# Patient Record
Sex: FEMALE | Race: WHITE
[De-identification: names, ages, dates, MRNs, and addresses within clinical notes are randomized per-mention and may not be internally consistent; named-entity substitution may affect disease eponyms.]

---

## 2023-06-20 ENCOUNTER — HOSPITAL ENCOUNTER
Dept: HOSPITAL 101 - LAB | Age: 58
End: 2023-06-20
Payer: COMMERCIAL

## 2023-06-20 DIAGNOSIS — E78.5: Primary | ICD-10-CM

## 2023-06-20 LAB
CHOLESTEROL: 225 MG/DL (ref ?–200)
HDL CHOLESTEROL: 75 MG/DL (ref 40–60)
TRIGLYCERIDES: 60 MG/DL (ref ?–150)
VLDL CHOLESTEROL: 12 MG/DL

## 2023-06-20 PROCEDURE — 80061 LIPID PANEL: CPT

## 2023-06-20 PROCEDURE — 36415 COLL VENOUS BLD VENIPUNCTURE: CPT

## 2023-07-13 ENCOUNTER — HOSPITAL ENCOUNTER
Dept: HOSPITAL 101 - LAB | Age: 58
Discharge: HOME | End: 2023-07-13
Payer: COMMERCIAL

## 2023-07-13 DIAGNOSIS — Z00.00: Primary | ICD-10-CM

## 2023-07-13 LAB
ADD MANUAL DIFF: NO
ALANINE AMINOTRANSFERASE: 31 U/L (ref 14–59)
ALBUMIN GLOBULIN RATIO: 1.3
ALBUMIN LEVEL: 4.2 G/DL (ref 3.4–5)
ALKALINE PHOSPHATASE: 63 U/L (ref 46–116)
ANION GAP: 11
ASPARTATE AMINO TRANSFERASE: 18 U/L (ref 15–37)
BLOOD UREA NITROGEN: 14 MG/DL (ref 7–18)
CALCIUM: 9.2 MG/DL (ref 8.5–10.1)
CARBON DIOXIDE: 29.4 MMOL/L (ref 21–32)
CAST SEEN?: (no result) #/LPF
CHLORIDE: 105 MMOL/L (ref 98–107)
CO2 BLD-SCNC: 29.4 MMOL/L (ref 21–32)
ESTIMATED GFR (AFRICAN AMERICA: >60 (ref 60–?)
ESTIMATED GFR (NON-AFRICAN AME: >60 (ref 60–?)
GLOBULIN: 3.3 G/DL
GLUCOSE BLD-MCNC: 98 MG/DL (ref 74–106)
GLUCOSE URINE UA: NEGATIVE MG/DL
HCT VFR BLD CALC: 42.5 % (ref 36–48)
HEMATOCRIT: 42.5 % (ref 36–48)
HEMOGLOBIN: 13.8 G/DL (ref 12–16)
IMMATURE GRANULOCYTES ABS AUTO: 0.01 10^3/UL (ref 0–0.03)
IMMATURE GRANULOCYTES PCT AUTO: 0.2 % (ref 0–0.5)
IRON: 67 UG/DL (ref 50–170)
LYMPHOCYTES  ABSOLUTE AUTO: 1.5 10^3/UL (ref 1.2–3.8)
MCV RBC: 90.4 FL (ref 81–99)
MEAN CORPUSCULAR HEMOGLOBIN: 29.4 PG (ref 26.7–34)
MEAN CORPUSCULAR HGB CONC: 32.5 G/DL (ref 29.9–35.2)
MEAN CORPUSCULAR VOLUME: 90.4 FL (ref 81–99)
PLATELET # BLD: 338 10^3/UL (ref 150–450)
PLATELET COUNT: 338 10^3/UL (ref 150–450)
POTASSIUM SERPLBLD-SCNC: 4.4 MMOL/L (ref 3.5–5.1)
POTASSIUM: 4.4 MMOL/L (ref 3.5–5.1)
RED BLOOD COUNT: 4.7 10^6/UL (ref 4.2–5.4)
SODIUM BLD-SCNC: 141 MMOL/L (ref 136–145)
SODIUM: 141 MMOL/L (ref 136–145)
THYROID STIMULATING HORMONE: 1 UIU/ML (ref 0.36–3.74)
TOTAL PROTEIN: 7.5 G/DL (ref 6.4–8.2)
WBC # BLD: 5.4 10^3/UL (ref 4–11)
WHITE BLOOD COUNT: 5.4 10^3/UL (ref 4–11)

## 2023-07-13 PROCEDURE — 84443 ASSAY THYROID STIM HORMONE: CPT

## 2023-07-13 PROCEDURE — 84439 ASSAY OF FREE THYROXINE: CPT

## 2023-07-13 PROCEDURE — 83540 ASSAY OF IRON: CPT

## 2023-07-13 PROCEDURE — 87150 DNA/RNA AMPLIFIED PROBE: CPT

## 2023-07-13 PROCEDURE — 85025 COMPLETE CBC W/AUTO DIFF WBC: CPT

## 2023-07-13 PROCEDURE — 81001 URINALYSIS AUTO W/SCOPE: CPT

## 2023-07-13 PROCEDURE — 36415 COLL VENOUS BLD VENIPUNCTURE: CPT

## 2023-07-13 PROCEDURE — 80061 LIPID PANEL: CPT

## 2023-07-13 PROCEDURE — 80053 COMPREHEN METABOLIC PANEL: CPT

## 2023-07-13 PROCEDURE — 87086 URINE CULTURE/COLONY COUNT: CPT

## 2023-07-13 PROCEDURE — 83525 ASSAY OF INSULIN: CPT

## 2023-07-13 PROCEDURE — 87186 SC STD MICRODIL/AGAR DIL: CPT

## 2023-07-13 PROCEDURE — 83036 HEMOGLOBIN GLYCOSYLATED A1C: CPT

## 2023-07-14 LAB
CHOLESTEROL: 216 MG/DL (ref ?–200)
HDL CHOLESTEROL: 67 MG/DL (ref 40–60)
TRIGLYCERIDES: 66 MG/DL (ref ?–150)
VLDL CHOLESTEROL: 13.2 MG/DL

## 2023-07-24 ENCOUNTER — HOSPITAL ENCOUNTER
Dept: HOSPITAL 101 - LAB | Age: 58
Discharge: HOME | End: 2023-07-24
Payer: COMMERCIAL

## 2023-07-24 ENCOUNTER — HOSPITAL ENCOUNTER (OUTPATIENT)
Age: 58
Discharge: HOME | End: 2023-07-24
Payer: COMMERCIAL

## 2023-07-24 DIAGNOSIS — N30.00: Primary | ICD-10-CM

## 2023-07-24 LAB
ABO + RH BLD: NORMAL
ARM BAND NUMBER: NORMAL
BLOOD BANK SAMPLE EXPIRATION: NORMAL
BLOOD GROUP ANTIBODIES SERPL: NEGATIVE
CAST SEEN?: (no result) #/LPF
GLUCOSE URINE UA: NEGATIVE MG/DL
URINE CULTURE INDICATED: (no result)

## 2023-07-24 PROCEDURE — 81001 URINALYSIS AUTO W/SCOPE: CPT

## 2023-07-24 PROCEDURE — 36415 COLL VENOUS BLD VENIPUNCTURE: CPT

## 2023-07-24 PROCEDURE — 86850 RBC ANTIBODY SCREEN: CPT

## 2023-07-24 PROCEDURE — 86900 BLOOD TYPING SEROLOGIC ABO: CPT

## 2023-07-24 PROCEDURE — 86901 BLOOD TYPING SEROLOGIC RH(D): CPT

## 2023-07-24 PROCEDURE — 87086 URINE CULTURE/COLONY COUNT: CPT

## 2023-07-25 RX ORDER — ASPIRIN 81 MG/1
81 TABLET ORAL
COMMUNITY
Start: 2023-02-10

## 2023-07-25 RX ORDER — EZETIMIBE 10 MG/1
10 TABLET ORAL
COMMUNITY
Start: 2023-02-06

## 2023-07-25 RX ORDER — LANSOPRAZOLE 30 MG/1
30 CAPSULE, DELAYED RELEASE ORAL DAILY
COMMUNITY

## 2023-07-25 NOTE — PROGRESS NOTES
Preoperative Instructions:    Stop eating solid foods at midnight the night prior to your surgery. Stop drinking clear liquids at midnight the night prior to your surgery. Arrive at the surgery center (3rd entrance) on __0-80-29_____________ by __0830_____________. Please stop any blood thinning medications as directed by your surgeon or prescribing physician. Failure to stop certain medications may interfere with your scheduled surgery. These may include: Aspirin, Coumadin, Plavix, NSAIDS (Motrin, Aleve, Advil, Mobic, Celebrex), Eliquis, Pradaxa, Xarelto, Fish oil, and herbal supplements. Hold Aspirin as     You may continue the rest of your medications through the night before surgery unless instructed otherwise. Day of surgery please take only the following medication(s) with a small sip of water: Prevacid      Please shower  with antibacterial saop and water the day before and the morning of  surgery and the  day of surgery. Reminders:  -If you are going home the day of your procedure, you will need a family member or friend to stay during the procedure and drive you home after your procedure. Your  must be 25years of age or older and able to sign off on your discharge instructions.    -If you are going home the same day of your surgery, someone must remain with you for the first 24 hours after your surgery if you receive sedation or anesthesia.      -Please do not wear any jewelery , lotions or body piercing the day of surgery

## 2023-07-26 ENCOUNTER — ANESTHESIA EVENT (OUTPATIENT)
Dept: OPERATING ROOM | Age: 58
End: 2023-07-26
Payer: COMMERCIAL

## 2023-07-30 PROBLEM — N81.6 CYSTOCELE WITH RECTOCELE: Status: ACTIVE | Noted: 2023-07-30

## 2023-07-30 PROBLEM — N81.10 CYSTOCELE WITH RECTOCELE: Status: ACTIVE | Noted: 2023-07-30

## 2023-07-30 PROBLEM — N81.4 UTERINE PROLAPSE: Status: ACTIVE | Noted: 2023-07-30

## 2023-07-30 PROBLEM — Z98.890 POSTOPERATIVE STATE: Status: ACTIVE | Noted: 2023-07-30

## 2023-07-30 PROBLEM — N39.3 STRESS INCONTINENCE OF URINE: Status: ACTIVE | Noted: 2023-07-30

## 2023-07-31 ENCOUNTER — ANESTHESIA (OUTPATIENT)
Dept: OPERATING ROOM | Age: 58
End: 2023-07-31
Payer: COMMERCIAL

## 2023-07-31 ENCOUNTER — HOSPITAL ENCOUNTER (OUTPATIENT)
Age: 58
Setting detail: OUTPATIENT SURGERY
Discharge: HOME | End: 2023-07-31
Attending: OBSTETRICS & GYNECOLOGY | Admitting: OBSTETRICS & GYNECOLOGY
Payer: COMMERCIAL

## 2023-07-31 VITALS
HEART RATE: 59 BPM | BODY MASS INDEX: 27.99 KG/M2 | SYSTOLIC BLOOD PRESSURE: 128 MMHG | TEMPERATURE: 97.2 F | HEIGHT: 65 IN | WEIGHT: 168 LBS | DIASTOLIC BLOOD PRESSURE: 82 MMHG | OXYGEN SATURATION: 98 % | RESPIRATION RATE: 11 BRPM

## 2023-07-31 DIAGNOSIS — N81.11 CYSTOCELE, MIDLINE: ICD-10-CM

## 2023-07-31 DIAGNOSIS — N81.2 UTEROVAGINAL PROLAPSE, INCOMPLETE: ICD-10-CM

## 2023-07-31 DIAGNOSIS — N81.6 RECTOCELE: ICD-10-CM

## 2023-07-31 DIAGNOSIS — G89.18 POST-OP PAIN: Primary | ICD-10-CM

## 2023-07-31 DIAGNOSIS — R32 URINARY INCONTINENCE, UNSPECIFIED TYPE: ICD-10-CM

## 2023-07-31 DIAGNOSIS — R35.0 URINARY FREQUENCY: ICD-10-CM

## 2023-07-31 LAB
ABO + RH BLD: NORMAL
ARM BAND NUMBER: NORMAL
BLOOD BANK SAMPLE EXPIRATION: NORMAL
BLOOD GROUP ANTIBODIES SERPL: NEGATIVE
HCG, PREGNANCY URINE (POC): NEGATIVE
HCT VFR BLD AUTO: 38 % (ref 36–46)
HGB BLD-MCNC: 12.6 G/DL (ref 12–16)

## 2023-07-31 PROCEDURE — 2580000003 HC RX 258: Performed by: ANESTHESIOLOGY

## 2023-07-31 PROCEDURE — 7100000011 HC PHASE II RECOVERY - ADDTL 15 MIN: Performed by: OBSTETRICS & GYNECOLOGY

## 2023-07-31 PROCEDURE — 6370000000 HC RX 637 (ALT 250 FOR IP)

## 2023-07-31 PROCEDURE — 6360000002 HC RX W HCPCS

## 2023-07-31 PROCEDURE — 3700000000 HC ANESTHESIA ATTENDED CARE: Performed by: OBSTETRICS & GYNECOLOGY

## 2023-07-31 PROCEDURE — 86850 RBC ANTIBODY SCREEN: CPT

## 2023-07-31 PROCEDURE — 2580000003 HC RX 258: Performed by: OBSTETRICS & GYNECOLOGY

## 2023-07-31 PROCEDURE — 7100000010 HC PHASE II RECOVERY - FIRST 15 MIN: Performed by: OBSTETRICS & GYNECOLOGY

## 2023-07-31 PROCEDURE — 7100000000 HC PACU RECOVERY - FIRST 15 MIN: Performed by: OBSTETRICS & GYNECOLOGY

## 2023-07-31 PROCEDURE — S2900 ROBOTIC SURGICAL SYSTEM: HCPCS | Performed by: OBSTETRICS & GYNECOLOGY

## 2023-07-31 PROCEDURE — 3600000019 HC SURGERY ROBOT ADDTL 15MIN: Performed by: OBSTETRICS & GYNECOLOGY

## 2023-07-31 PROCEDURE — 6370000000 HC RX 637 (ALT 250 FOR IP): Performed by: ANESTHESIOLOGY

## 2023-07-31 PROCEDURE — 6360000002 HC RX W HCPCS: Performed by: OBSTETRICS & GYNECOLOGY

## 2023-07-31 PROCEDURE — 2580000003 HC RX 258: Performed by: NURSE ANESTHETIST, CERTIFIED REGISTERED

## 2023-07-31 PROCEDURE — 3700000001 HC ADD 15 MINUTES (ANESTHESIA): Performed by: OBSTETRICS & GYNECOLOGY

## 2023-07-31 PROCEDURE — 85018 HEMOGLOBIN: CPT

## 2023-07-31 PROCEDURE — 2709999900 HC NON-CHARGEABLE SUPPLY: Performed by: OBSTETRICS & GYNECOLOGY

## 2023-07-31 PROCEDURE — 88302 TISSUE EXAM BY PATHOLOGIST: CPT

## 2023-07-31 PROCEDURE — L8606 SYNTHETIC IMPLNT URINARY 1ML: HCPCS | Performed by: OBSTETRICS & GYNECOLOGY

## 2023-07-31 PROCEDURE — 3600000009 HC SURGERY ROBOT BASE: Performed by: OBSTETRICS & GYNECOLOGY

## 2023-07-31 PROCEDURE — 6360000002 HC RX W HCPCS: Performed by: NURSE ANESTHETIST, CERTIFIED REGISTERED

## 2023-07-31 PROCEDURE — 86901 BLOOD TYPING SEROLOGIC RH(D): CPT

## 2023-07-31 PROCEDURE — 88305 TISSUE EXAM BY PATHOLOGIST: CPT

## 2023-07-31 PROCEDURE — 2500000003 HC RX 250 WO HCPCS: Performed by: NURSE ANESTHETIST, CERTIFIED REGISTERED

## 2023-07-31 PROCEDURE — 85014 HEMATOCRIT: CPT

## 2023-07-31 PROCEDURE — 36415 COLL VENOUS BLD VENIPUNCTURE: CPT

## 2023-07-31 PROCEDURE — 7100000001 HC PACU RECOVERY - ADDTL 15 MIN: Performed by: OBSTETRICS & GYNECOLOGY

## 2023-07-31 PROCEDURE — 86900 BLOOD TYPING SEROLOGIC ABO: CPT

## 2023-07-31 PROCEDURE — 6360000002 HC RX W HCPCS: Performed by: ANESTHESIOLOGY

## 2023-07-31 PROCEDURE — 81025 URINE PREGNANCY TEST: CPT

## 2023-07-31 DEVICE — BULKAMID URETHRAL BULKING SYSTEM 2ML
Type: IMPLANTABLE DEVICE | Site: URETHRA | Status: FUNCTIONAL
Brand: BULKAMID

## 2023-07-31 RX ORDER — SODIUM CHLORIDE 0.9 % (FLUSH) 0.9 %
5-40 SYRINGE (ML) INJECTION EVERY 12 HOURS SCHEDULED
Status: DISCONTINUED | OUTPATIENT
Start: 2023-07-31 | End: 2023-07-31 | Stop reason: HOSPADM

## 2023-07-31 RX ORDER — SENNA AND DOCUSATE SODIUM 50; 8.6 MG/1; MG/1
1 TABLET, FILM COATED ORAL 2 TIMES DAILY
Qty: 60 TABLET | Refills: 1 | Status: SHIPPED | OUTPATIENT
Start: 2023-07-31

## 2023-07-31 RX ORDER — SODIUM CHLORIDE 0.9 % (FLUSH) 0.9 %
5-40 SYRINGE (ML) INJECTION PRN
Status: DISCONTINUED | OUTPATIENT
Start: 2023-07-31 | End: 2023-07-31 | Stop reason: HOSPADM

## 2023-07-31 RX ORDER — HYDROCODONE BITARTRATE AND ACETAMINOPHEN 5; 325 MG/1; MG/1
1 TABLET ORAL ONCE
Status: COMPLETED | OUTPATIENT
Start: 2023-07-31 | End: 2023-07-31

## 2023-07-31 RX ORDER — PROPOFOL 10 MG/ML
INJECTION, EMULSION INTRAVENOUS PRN
Status: DISCONTINUED | OUTPATIENT
Start: 2023-07-31 | End: 2023-07-31 | Stop reason: SDUPTHER

## 2023-07-31 RX ORDER — FENTANYL CITRATE 50 UG/ML
INJECTION, SOLUTION INTRAMUSCULAR; INTRAVENOUS PRN
Status: DISCONTINUED | OUTPATIENT
Start: 2023-07-31 | End: 2023-07-31 | Stop reason: SDUPTHER

## 2023-07-31 RX ORDER — KETOROLAC TROMETHAMINE 30 MG/ML
INJECTION, SOLUTION INTRAMUSCULAR; INTRAVENOUS PRN
Status: DISCONTINUED | OUTPATIENT
Start: 2023-07-31 | End: 2023-07-31 | Stop reason: SDUPTHER

## 2023-07-31 RX ORDER — BUPIVACAINE HYDROCHLORIDE 5 MG/ML
INJECTION, SOLUTION PERINEURAL PRN
Status: DISCONTINUED | OUTPATIENT
Start: 2023-07-31 | End: 2023-07-31 | Stop reason: ALTCHOICE

## 2023-07-31 RX ORDER — MORPHINE SULFATE 2 MG/ML
2 INJECTION, SOLUTION INTRAMUSCULAR; INTRAVENOUS EVERY 5 MIN PRN
Status: DISCONTINUED | OUTPATIENT
Start: 2023-07-31 | End: 2023-07-31 | Stop reason: HOSPADM

## 2023-07-31 RX ORDER — HYDROCODONE BITARTRATE AND ACETAMINOPHEN 5; 325 MG/1; MG/1
1 TABLET ORAL EVERY 6 HOURS PRN
Qty: 20 TABLET | Refills: 0 | Status: SHIPPED | OUTPATIENT
Start: 2023-07-31 | End: 2023-08-05

## 2023-07-31 RX ORDER — NEOSTIGMINE METHYLSULFATE 5 MG/5 ML
SYRINGE (ML) INTRAVENOUS PRN
Status: DISCONTINUED | OUTPATIENT
Start: 2023-07-31 | End: 2023-07-31 | Stop reason: SDUPTHER

## 2023-07-31 RX ORDER — MIDAZOLAM HYDROCHLORIDE 2 MG/2ML
2 INJECTION, SOLUTION INTRAMUSCULAR; INTRAVENOUS
Status: DISCONTINUED | OUTPATIENT
Start: 2023-07-31 | End: 2023-07-31 | Stop reason: HOSPADM

## 2023-07-31 RX ORDER — SIMETHICONE 80 MG
80 TABLET,CHEWABLE ORAL 4 TIMES DAILY PRN
Qty: 60 TABLET | Refills: 1 | Status: SHIPPED | OUTPATIENT
Start: 2023-07-31

## 2023-07-31 RX ORDER — ONDANSETRON 2 MG/ML
4 INJECTION INTRAMUSCULAR; INTRAVENOUS
Status: COMPLETED | OUTPATIENT
Start: 2023-07-31 | End: 2023-07-31

## 2023-07-31 RX ORDER — MORPHINE SULFATE 2 MG/ML
INJECTION, SOLUTION INTRAMUSCULAR; INTRAVENOUS
Status: DISCONTINUED
Start: 2023-07-31 | End: 2023-07-31 | Stop reason: HOSPADM

## 2023-07-31 RX ORDER — SODIUM CHLORIDE 9 MG/ML
INJECTION, SOLUTION INTRAVENOUS CONTINUOUS PRN
Status: DISCONTINUED | OUTPATIENT
Start: 2023-07-31 | End: 2023-07-31 | Stop reason: SDUPTHER

## 2023-07-31 RX ORDER — MEPERIDINE HYDROCHLORIDE 50 MG/ML
12.5 INJECTION INTRAMUSCULAR; INTRAVENOUS; SUBCUTANEOUS EVERY 5 MIN PRN
Status: DISCONTINUED | OUTPATIENT
Start: 2023-07-31 | End: 2023-07-31 | Stop reason: HOSPADM

## 2023-07-31 RX ORDER — PHENAZOPYRIDINE HYDROCHLORIDE 100 MG/1
TABLET, FILM COATED ORAL
Status: DISCONTINUED
Start: 2023-07-31 | End: 2023-07-31 | Stop reason: HOSPADM

## 2023-07-31 RX ORDER — SODIUM CHLORIDE 9 MG/ML
INJECTION, SOLUTION INTRAVENOUS PRN
Status: DISCONTINUED | OUTPATIENT
Start: 2023-07-31 | End: 2023-07-31 | Stop reason: HOSPADM

## 2023-07-31 RX ORDER — MORPHINE SULFATE 2 MG/ML
INJECTION, SOLUTION INTRAMUSCULAR; INTRAVENOUS
Status: COMPLETED
Start: 2023-07-31 | End: 2023-07-31

## 2023-07-31 RX ORDER — LIDOCAINE HYDROCHLORIDE 10 MG/ML
INJECTION, SOLUTION INFILTRATION; PERINEURAL PRN
Status: DISCONTINUED | OUTPATIENT
Start: 2023-07-31 | End: 2023-07-31 | Stop reason: SDUPTHER

## 2023-07-31 RX ORDER — CEPHALEXIN 500 MG/1
500 CAPSULE ORAL 3 TIMES DAILY
Qty: 21 CAPSULE | Refills: 0 | Status: SHIPPED | OUTPATIENT
Start: 2023-07-31 | End: 2023-08-07

## 2023-07-31 RX ORDER — IBUPROFEN 600 MG/1
600 TABLET ORAL EVERY 6 HOURS PRN
Qty: 60 TABLET | Refills: 1 | Status: SHIPPED | OUTPATIENT
Start: 2023-07-31 | End: 2023-08-30

## 2023-07-31 RX ORDER — METOCLOPRAMIDE HYDROCHLORIDE 5 MG/ML
INJECTION INTRAMUSCULAR; INTRAVENOUS
Status: DISCONTINUED
Start: 2023-07-31 | End: 2023-07-31 | Stop reason: HOSPADM

## 2023-07-31 RX ORDER — ONDANSETRON 2 MG/ML
INJECTION INTRAMUSCULAR; INTRAVENOUS
Status: DISCONTINUED
Start: 2023-07-31 | End: 2023-07-31 | Stop reason: HOSPADM

## 2023-07-31 RX ORDER — SCOLOPAMINE TRANSDERMAL SYSTEM 1 MG/1
PATCH, EXTENDED RELEASE TRANSDERMAL
Status: DISCONTINUED
Start: 2023-07-31 | End: 2023-07-31 | Stop reason: HOSPADM

## 2023-07-31 RX ORDER — LABETALOL HYDROCHLORIDE 5 MG/ML
10 INJECTION, SOLUTION INTRAVENOUS
Status: DISCONTINUED | OUTPATIENT
Start: 2023-07-31 | End: 2023-07-31 | Stop reason: HOSPADM

## 2023-07-31 RX ORDER — TAMSULOSIN HYDROCHLORIDE 0.4 MG/1
0.4 CAPSULE ORAL DAILY
Qty: 7 CAPSULE | Refills: 0 | Status: SHIPPED | OUTPATIENT
Start: 2023-07-31 | End: 2023-07-31 | Stop reason: HOSPADM

## 2023-07-31 RX ORDER — GLYCOPYRROLATE 0.2 MG/ML
INJECTION INTRAMUSCULAR; INTRAVENOUS PRN
Status: DISCONTINUED | OUTPATIENT
Start: 2023-07-31 | End: 2023-07-31 | Stop reason: SDUPTHER

## 2023-07-31 RX ORDER — ROCURONIUM BROMIDE 10 MG/ML
INJECTION, SOLUTION INTRAVENOUS PRN
Status: DISCONTINUED | OUTPATIENT
Start: 2023-07-31 | End: 2023-07-31 | Stop reason: SDUPTHER

## 2023-07-31 RX ORDER — DEXAMETHASONE SODIUM PHOSPHATE 10 MG/ML
INJECTION, SOLUTION INTRAMUSCULAR; INTRAVENOUS PRN
Status: DISCONTINUED | OUTPATIENT
Start: 2023-07-31 | End: 2023-07-31 | Stop reason: SDUPTHER

## 2023-07-31 RX ORDER — CEFAZOLIN 2 G/1
INJECTION, POWDER, FOR SOLUTION INTRAMUSCULAR; INTRAVENOUS
Status: DISCONTINUED
Start: 2023-07-31 | End: 2023-07-31 | Stop reason: HOSPADM

## 2023-07-31 RX ORDER — MIDAZOLAM HYDROCHLORIDE 1 MG/ML
INJECTION INTRAMUSCULAR; INTRAVENOUS PRN
Status: DISCONTINUED | OUTPATIENT
Start: 2023-07-31 | End: 2023-07-31 | Stop reason: SDUPTHER

## 2023-07-31 RX ORDER — SCOLOPAMINE TRANSDERMAL SYSTEM 1 MG/1
1 PATCH, EXTENDED RELEASE TRANSDERMAL
Status: DISCONTINUED | OUTPATIENT
Start: 2023-07-31 | End: 2023-07-31 | Stop reason: HOSPADM

## 2023-07-31 RX ORDER — PHENAZOPYRIDINE HYDROCHLORIDE 100 MG/1
200 TABLET, FILM COATED ORAL ONCE
Status: COMPLETED | OUTPATIENT
Start: 2023-07-31 | End: 2023-07-31

## 2023-07-31 RX ORDER — SODIUM CHLORIDE, SODIUM LACTATE, POTASSIUM CHLORIDE, CALCIUM CHLORIDE 600; 310; 30; 20 MG/100ML; MG/100ML; MG/100ML; MG/100ML
INJECTION, SOLUTION INTRAVENOUS CONTINUOUS
Status: DISCONTINUED | OUTPATIENT
Start: 2023-07-31 | End: 2023-07-31 | Stop reason: HOSPADM

## 2023-07-31 RX ORDER — PHENAZOPYRIDINE HYDROCHLORIDE 100 MG/1
TABLET, FILM COATED ORAL
Status: COMPLETED
Start: 2023-07-31 | End: 2023-07-31

## 2023-07-31 RX ORDER — METOCLOPRAMIDE HYDROCHLORIDE 5 MG/ML
10 INJECTION INTRAMUSCULAR; INTRAVENOUS
Status: COMPLETED | OUTPATIENT
Start: 2023-07-31 | End: 2023-07-31

## 2023-07-31 RX ORDER — DIPHENHYDRAMINE HYDROCHLORIDE 50 MG/ML
12.5 INJECTION INTRAMUSCULAR; INTRAVENOUS
Status: DISCONTINUED | OUTPATIENT
Start: 2023-07-31 | End: 2023-07-31 | Stop reason: HOSPADM

## 2023-07-31 RX ORDER — HYDROCODONE BITARTRATE AND ACETAMINOPHEN 5; 325 MG/1; MG/1
TABLET ORAL
Status: DISCONTINUED
Start: 2023-07-31 | End: 2023-07-31 | Stop reason: HOSPADM

## 2023-07-31 RX ORDER — ONDANSETRON 4 MG/1
4 TABLET, FILM COATED ORAL EVERY 6 HOURS PRN
Qty: 20 TABLET | Refills: 1 | Status: SHIPPED | OUTPATIENT
Start: 2023-07-31

## 2023-07-31 RX ORDER — ONDANSETRON 2 MG/ML
INJECTION INTRAMUSCULAR; INTRAVENOUS PRN
Status: DISCONTINUED | OUTPATIENT
Start: 2023-07-31 | End: 2023-07-31 | Stop reason: SDUPTHER

## 2023-07-31 RX ADMIN — MORPHINE SULFATE 2 MG: 2 INJECTION, SOLUTION INTRAMUSCULAR; INTRAVENOUS at 12:58

## 2023-07-31 RX ADMIN — GLYCOPYRROLATE 0.4 MG: 0.2 INJECTION INTRAMUSCULAR; INTRAVENOUS at 12:11

## 2023-07-31 RX ADMIN — PHENAZOPYRIDINE HYDROCHLORIDE 200 MG: 100 TABLET, FILM COATED ORAL at 09:21

## 2023-07-31 RX ADMIN — Medication 3 MG: at 12:11

## 2023-07-31 RX ADMIN — ROCURONIUM BROMIDE 50 MG: 10 INJECTION, SOLUTION INTRAVENOUS at 10:43

## 2023-07-31 RX ADMIN — FENTANYL CITRATE 50 MCG: 50 INJECTION, SOLUTION INTRAMUSCULAR; INTRAVENOUS at 12:39

## 2023-07-31 RX ADMIN — SODIUM CHLORIDE, POTASSIUM CHLORIDE, SODIUM LACTATE AND CALCIUM CHLORIDE: 600; 310; 30; 20 INJECTION, SOLUTION INTRAVENOUS at 10:40

## 2023-07-31 RX ADMIN — LIDOCAINE HYDROCHLORIDE 40 MG: 10 INJECTION, SOLUTION INFILTRATION; PERINEURAL at 10:43

## 2023-07-31 RX ADMIN — FENTANYL CITRATE 50 MCG: 50 INJECTION, SOLUTION INTRAMUSCULAR; INTRAVENOUS at 12:13

## 2023-07-31 RX ADMIN — DEXAMETHASONE SODIUM PHOSPHATE 10 MG: 10 INJECTION, SOLUTION INTRAMUSCULAR; INTRAVENOUS at 10:57

## 2023-07-31 RX ADMIN — MORPHINE SULFATE 2 MG: 2 INJECTION, SOLUTION INTRAMUSCULAR; INTRAVENOUS at 13:13

## 2023-07-31 RX ADMIN — PHENAZOPYRIDINE HYDROCHLORIDE 200 MG: 100 TABLET ORAL at 09:21

## 2023-07-31 RX ADMIN — HYDROMORPHONE HYDROCHLORIDE 0.5 MG: 1 INJECTION, SOLUTION INTRAMUSCULAR; INTRAVENOUS; SUBCUTANEOUS at 13:45

## 2023-07-31 RX ADMIN — ONDANSETRON 4 MG: 2 INJECTION INTRAMUSCULAR; INTRAVENOUS at 14:08

## 2023-07-31 RX ADMIN — CEFAZOLIN 2000 MG: 2 INJECTION, POWDER, FOR SOLUTION INTRAMUSCULAR; INTRAVENOUS at 10:54

## 2023-07-31 RX ADMIN — ONDANSETRON 4 MG: 2 INJECTION INTRAMUSCULAR; INTRAVENOUS at 12:10

## 2023-07-31 RX ADMIN — FENTANYL CITRATE 100 MCG: 50 INJECTION, SOLUTION INTRAMUSCULAR; INTRAVENOUS at 10:43

## 2023-07-31 RX ADMIN — METOCLOPRAMIDE 10 MG: 5 INJECTION, SOLUTION INTRAMUSCULAR; INTRAVENOUS at 15:25

## 2023-07-31 RX ADMIN — HYDROCODONE BITARTRATE AND ACETAMINOPHEN 1 TABLET: 5; 325 TABLET ORAL at 15:28

## 2023-07-31 RX ADMIN — PROPOFOL 200 MG: 10 INJECTION, EMULSION INTRAVENOUS at 10:43

## 2023-07-31 RX ADMIN — KETOROLAC TROMETHAMINE 30 MG: 30 INJECTION, SOLUTION INTRAMUSCULAR; INTRAVENOUS at 12:10

## 2023-07-31 RX ADMIN — MIDAZOLAM 2 MG: 1 INJECTION INTRAMUSCULAR; INTRAVENOUS at 10:40

## 2023-07-31 RX ADMIN — ROCURONIUM BROMIDE 20 MG: 10 INJECTION, SOLUTION INTRAVENOUS at 11:14

## 2023-07-31 RX ADMIN — SODIUM CHLORIDE: 9 INJECTION, SOLUTION INTRAVENOUS at 12:10

## 2023-07-31 ASSESSMENT — PAIN SCALES - GENERAL
PAINLEVEL_OUTOF10: 6
PAINLEVEL_OUTOF10: 5
PAINLEVEL_OUTOF10: 7
PAINLEVEL_OUTOF10: 4
PAINLEVEL_OUTOF10: 0
PAINLEVEL_OUTOF10: 3

## 2023-07-31 ASSESSMENT — PAIN - FUNCTIONAL ASSESSMENT: PAIN_FUNCTIONAL_ASSESSMENT: NONE - DENIES PAIN

## 2023-07-31 ASSESSMENT — PAIN DESCRIPTION - DESCRIPTORS
DESCRIPTORS: PRESSURE
DESCRIPTORS: ACHING;SORE;PRESSURE
DESCRIPTORS: PRESSURE;SORE
DESCRIPTORS: ACHING;SORE;PRESSURE

## 2023-07-31 ASSESSMENT — PAIN DESCRIPTION - LOCATION
LOCATION: ABDOMEN

## 2023-07-31 ASSESSMENT — PAIN DESCRIPTION - ORIENTATION
ORIENTATION: LOWER

## 2023-07-31 NOTE — ANESTHESIA POSTPROCEDURE EVALUATION
Department of Anesthesiology  Postprocedure Note    Patient: Yoni Ramos  MRN: [id number]  YOB: 1965  Date of evaluation: 7/31/2023      Procedure Summary     Date: 07/31/23 Room / Location: [facility] OR [address] [place] [address]    Anesthesia Start: 8553 Anesthesia Stop: 6389    Procedures:       LAPAROSCOPIC ROBOTIC ASSISTED VAGINAL HYSTERECTOMY  WITH BILATERAL SALPINGECTOMY,  ANTERIOR COLPORRHAPHY AND AND POSTERIOR COLPOPERINEORRHAPHY      CYSTOSCOPY WITH BULKAMID INJECTION Diagnosis:       Urinary incontinence, unspecified type      Urinary frequency      Cystocele, midline      Rectocele      Uterovaginal prolapse, incomplete      (Urinary incontinence, unspecified type [R32])      (Urinary frequency [R35.0])      (Cystocele, midline [N81.11])      (Rectocele [N81.6])      (Uterovaginal prolapse, incomplete [N81.2])    Surgeons:  Alessia Pugh DO Responsible Provider: Rico Medellin MD    Anesthesia Type: general ASA Status: 2          Anesthesia Type: General    Chelsie Phase I: Chelsie Score: 8    Chelsie Phase II:        Anesthesia Post Evaluation    Patient location during evaluation: PACU  Patient participation: complete - patient participated  Level of consciousness: awake  Airway patency: patent  Nausea & Vomiting: no nausea and no vomiting  Complications: no  Cardiovascular status: blood pressure returned to baseline  Respiratory status: acceptable  Hydration status: euvolemic  Pain management: adequate

## 2023-07-31 NOTE — H&P
UroGyn Pre-Op H&P  [facility]    Patient Name: Allyn Jeter     Patient : 1965  Room/Bed: STVZP OR [place] RM/NONE  Admission Date/Time: 2023  8:14 AM  Primary Care Physician: Sona Santillan MD  MRN: [id number]    Date: 2023  Time: 10:06 AM    The patient was seen in pre-op holding. She is here for previously scheduled Robotic assisted laparoscopic hysterectomy, bilateral salpingo-oophorectomy, anterior and posterior vaginal repairs, cystoscopy with Bulkamid injection. The patient is being admitted for a planned elective surgical procedure today. She has had symptoms, physical findings, and diagnostic testing that have an appropriate indication for today's planned procedure. The patient has been educated about their condition, conservative and surgical options was been offered to the patient, and the patient has decided to proceed with a surgical option. An informed consent has been signed under no duress or confusion. If indicated, the patient has been surgically cleared by her PCP and /or any pertinent specialist. All labs and testing have been reviewed. If of reproductive age and without permanent sterilization, a pregnancy test was confirmed as negative. The patient has satisfactorily completed any pre-operative preparations prior to surgery. If MRSA positive, she had completed the standard surgical preparation protocol. The patient took any required medicines prior to surgery with a sip of water but otherwise had taken nothing by mouth. The patient has discontinued any blood thinners as required to proceed with surgery. The patient denies chest pain, shortness of breath, calf pain, or open sores on the day of surgery. All dentures and body jewelry have been removed and / or taped. REVIEW OF SYSTEMS:  14 point ROS negative except for above listed in HPI.    General/Constitutional: Denies chills, fever, headaches, weight gain, weight loss   Ophthalmologic: Denies recent

## 2023-07-31 NOTE — DISCHARGE SUMMARY
5-325 MG per tablet  Commonly known as: Norco  Take 1 tablet by mouth every 6 hours as needed for Pain for up to 5 days. Intended supply: 5 days. Take lowest dose possible to manage pain Max Daily Amount: 4 tablets     ibuprofen 600 MG tablet  Commonly known as: ADVIL;MOTRIN  Take 1 tablet by mouth every 6 hours as needed for Pain     ondansetron 4 MG tablet  Commonly known as: Zofran  Take 1 tablet by mouth every 6 hours as needed for Nausea or Vomiting     sennosides-docusate sodium 8.6-50 MG tablet  Commonly known as: SENOKOT-S  Take 1 tablet by mouth in the morning and at bedtime     simethicone 80 MG chewable tablet  Commonly known as: MYLICON  Take 1 tablet by mouth 4 times daily as needed for Flatulence            CONTINUE taking these medications      aspirin 81 MG EC tablet     CLARITIN-D 12 HOUR PO     ezetimibe 10 MG tablet  Commonly known as: ZETIA     fluticasone 27.5 MCG/SPRAY nasal spray  Commonly known as: VERAMYST     lansoprazole 30 MG delayed release capsule  Commonly known as: PREVACID     MIRALAX PO               Where to Get Your Medications        These medications were sent to [facility], [facility] [address] [place] [address], [facility] [address]      Phone: [phone]   cephALEXin 500 MG capsule  HYDROcodone-acetaminophen 5-325 MG per tablet  ibuprofen 600 MG tablet  ondansetron 4 MG tablet  sennosides-docusate sodium 8.6-50 MG tablet  simethicone 80 MG chewable tablet           Activity: pelvic rest x 8 weeks, no driving on narcotics, no lifting greater than 15 lbs  Diet: regular diet  Follow up: 1 week     Condition on discharge: good and stable   Discharge Date: 7/31/23      Comments:  Home care, Follow-up care, restrictions reviewed.     Gurmeet Jacobson DO  Urogynecology Resident  [facility]  7/31/2023, 3:31 PM

## 2023-07-31 NOTE — PROGRESS NOTES
CLINICAL PHARMACY NOTE: MEDS TO BEDS    Total # of Prescriptions Filled: 7   The following medications were delivered to the patient:  Tamsulosin 0.4mg  Simethicone 80mg  Ondansetron 4mg  Cephalexin 500mg  Stool softener plus laxative 8.6-50mg  Hydrocodone-APAP 5-325mg  Ibuprofen 600mg    Additional Documentation:  Delivered to patient and guest at 1:15pm. Paid $20.87 with [employer].  HR

## 2023-07-31 NOTE — DISCHARGE INSTRUCTIONS
Cleaning the Skin Near the Stoma:  You may take showers, but check with your doctor about bathing, hot tubs, and swimming pools. Avoid using creams, powders or sprays near the tube site. Get the Things You Will Need:   Gloves      * Warm water and non-scented soap   Clean, dry washcloth and towel   * Clean gauze bandage   Surgical tape     * Plastic Trash Bag   Wash your hands with soap and water. Put on clean gloves   Hold the skin on all sides of the stoma. Gently take off the bandage. Be careful not to pull out the catheter. Throw the bandage away in the trash bag. Check for redness or swelling, colored or foul-smelling urine or drainage, or skin changes. These may be signs of an infection. Call your doctor. Hold the end of the catheter tube while cleaning. Wash the base of the catheter and the skin near the stoma with warm soap and water. Wash away from the stoma, gently pat dry with a towel. Secure the catheter with a clean gauze bandage and tape. Changing the Catheter:   Get the Things You Will Need:   Drainage bag       * Sterile gloves   Syringe to remove water in the catheter balloon   * Clean sterile gauze bandage   Surgical tape       * Plastic trash bag   Wash your hands with soap and water before and after changing the catheter. Put on clean gloves. Take off the old bandage or dressing and throw in the trash bag. Clean the skin at the site. Remove and throw away your gloves. Open packages carefully before putting on sterile gloves so you do not infect yourself once gloved. Be careful not to contaminate the sterile items inside the packages, mainly the new catheter. It is very helpful to have someone help you. Put on sterile gloves and take care to keep things sterile at all times when working with the new catheter. Fix the new catheter as you have been trained. Using the syringe, remove water from the old catheter bulb.    Keep your fingers close to the

## 2023-08-01 NOTE — OP NOTE
5 [facility], [facility][note 3]0 [facility]                                OPERATIVE REPORT    PATIENT NAME: Martín Tracey                     :        1965  MED REC NO:   [id number]                             ROOM:  ACCOUNT NO:   [de-identified]                           ADMIT DATE: 2023  PROVIDER:     Cathy Corona DO    DATE OF PROCEDURE:  2023    INDICATIONS FOR SURGERY:  Symptomatic pelvic organ prolapse unamenable  to conservative therapy, stress urinary incontinence with nonemergent  intrinsic sphincter deficiency. PREOPERATIVE DIAGNOSES:  Grade 3 uterine prolapse with grade 3  cystocele, grade 2 rectocele, poor tissue turgor, stress urinary  incontinence with nonemergent intrinsic sphincter deficiency, perineal  deficiency with introital gaping. PROCEDURE PERFORMED: Robotic-assisted laparoscopic hysterectomy with  bilateral salpingectomy, internal Wetzel-Spivey culdoplasty with  uterosacral suspension and enterocele repair, anterior colporrhaphy,  posterior colpoperineorrhaphy for gaping perineum, cystourethroscopy  with filling cystometrogram and Bulkamid transurethral bulking injection  therapy. SURGEON:  Felicia David DO    ASSISTANTS:  Murtaza Gambino DO and Tomas Stauffer DO.    ANESTHESIA:  General endotracheal.    FINDINGS:  As above. SPECIMENS:  Uterus, cervix, fallopian tubes, vaginal mucosa. COMPLICATIONS:  None. BLOOD LOSS:  30 mL. DRAINS:  Lozano catheter. IMPLANTS:  2 mL of Bulkamid. COURSE:  Prior to the procedure, risks and benefits of the procedure  explained to the patient. The patient understood and signed informed  consent under no duress or confusion. She received a preoperative  antibiotic and EPC cuffs were functional.    OPERATIVE PROCEDURE:  She was taken back to the OR and prepped and  draped in a sterile fashion in dorsal lithotomy position under general  anesthesia.

## 2023-08-03 LAB — SURGICAL PATHOLOGY REPORT: NORMAL

## 2023-08-04 ENCOUNTER — HOSPITAL ENCOUNTER (EMERGENCY)
Age: 58
Discharge: HOME | End: 2023-08-04
Attending: EMERGENCY MEDICINE
Payer: COMMERCIAL

## 2023-08-04 ENCOUNTER — APPOINTMENT (OUTPATIENT)
Dept: CT IMAGING | Age: 58
End: 2023-08-04
Payer: COMMERCIAL

## 2023-08-04 VITALS
RESPIRATION RATE: 14 BRPM | OXYGEN SATURATION: 96 % | DIASTOLIC BLOOD PRESSURE: 82 MMHG | SYSTOLIC BLOOD PRESSURE: 138 MMHG | HEART RATE: 70 BPM | TEMPERATURE: 98 F

## 2023-08-04 DIAGNOSIS — L25.9 CONTACT DERMATITIS, UNSPECIFIED CONTACT DERMATITIS TYPE, UNSPECIFIED TRIGGER: ICD-10-CM

## 2023-08-04 DIAGNOSIS — G89.18 ACUTE POST-OPERATIVE PAIN: Primary | ICD-10-CM

## 2023-08-04 LAB
ALBUMIN SERPL-MCNC: 4.2 G/DL (ref 3.5–5.2)
ALBUMIN/GLOB SERPL: 1.7 {RATIO} (ref 1–2.5)
ALP SERPL-CCNC: 67 U/L (ref 35–104)
ALT SERPL-CCNC: 23 U/L (ref 5–33)
ANION GAP SERPL CALCULATED.3IONS-SCNC: 10 MMOL/L (ref 9–17)
AST SERPL-CCNC: 21 U/L
BACTERIA URNS QL MICRO: ABNORMAL
BASOPHILS # BLD: 0.1 K/UL (ref 0–0.2)
BASOPHILS NFR BLD: 1 % (ref 0–2)
BILIRUB DIRECT SERPL-MCNC: 0.1 MG/DL
BILIRUB INDIRECT SERPL-MCNC: 0.2 MG/DL (ref 0–1)
BILIRUB SERPL-MCNC: 0.3 MG/DL (ref 0.3–1.2)
BILIRUB UR QL STRIP: NEGATIVE
BUN SERPL-MCNC: 13 MG/DL (ref 6–20)
CALCIUM SERPL-MCNC: 9.3 MG/DL (ref 8.6–10.4)
CHARACTER UR: ABNORMAL
CHLORIDE SERPL-SCNC: 101 MMOL/L (ref 98–107)
CLARITY UR: CLEAR
CO2 SERPL-SCNC: 28 MMOL/L (ref 20–31)
COLOR UR: YELLOW
CREAT SERPL-MCNC: 0.6 MG/DL (ref 0.5–0.9)
EOSINOPHIL # BLD: 0.5 K/UL (ref 0–0.4)
EOSINOPHILS RELATIVE PERCENT: 5 % (ref 1–4)
EPI CELLS #/AREA URNS HPF: ABNORMAL /HPF (ref 0–5)
ERYTHROCYTE [DISTWIDTH] IN BLOOD BY AUTOMATED COUNT: 12.9 % (ref 12.5–15.4)
GFR SERPL CREATININE-BSD FRML MDRD: >60 ML/MIN/1.73M2
GLUCOSE SERPL-MCNC: 102 MG/DL (ref 70–99)
GLUCOSE UR STRIP-MCNC: NEGATIVE MG/DL
HCT VFR BLD AUTO: 36.9 % (ref 36–46)
HGB BLD-MCNC: 12.5 G/DL (ref 12–16)
HGB UR QL STRIP.AUTO: ABNORMAL
KETONES UR STRIP-MCNC: NEGATIVE MG/DL
LEUKOCYTE ESTERASE UR QL STRIP: NEGATIVE
LIPASE SERPL-CCNC: 26 U/L (ref 13–60)
LYMPHOCYTES NFR BLD: 1.2 K/UL (ref 1–4.8)
LYMPHOCYTES RELATIVE PERCENT: 13 % (ref 24–44)
MCH RBC QN AUTO: 29.9 PG (ref 26–34)
MCHC RBC AUTO-ENTMCNC: 33.9 G/DL (ref 31–37)
MCV RBC AUTO: 88 FL (ref 80–100)
MONOCYTES NFR BLD: 0.6 K/UL (ref 0.1–1.2)
MONOCYTES NFR BLD: 6 % (ref 2–11)
NEUTROPHILS NFR BLD: 75 % (ref 36–66)
NEUTS SEG NFR BLD: 7.1 K/UL (ref 1.8–7.7)
NITRITE UR QL STRIP: NEGATIVE
PH UR STRIP: 7 [PH] (ref 5–8)
PLATELET # BLD AUTO: 287 K/UL (ref 140–450)
PMV BLD AUTO: 7.6 FL (ref 6–12)
POTASSIUM SERPL-SCNC: 4.3 MMOL/L (ref 3.7–5.3)
PROT SERPL-MCNC: 6.7 G/DL (ref 6.4–8.3)
PROT UR STRIP-MCNC: NEGATIVE MG/DL
RBC # BLD AUTO: 4.2 M/UL (ref 4–5.2)
RBC #/AREA URNS HPF: ABNORMAL /HPF (ref 0–2)
SODIUM SERPL-SCNC: 139 MMOL/L (ref 135–144)
SP GR UR STRIP: 1.01 (ref 1–1.03)
TROPONIN I SERPL HS-MCNC: 7 NG/L (ref 0–14)
UROBILINOGEN UR STRIP-ACNC: NORMAL EU/DL (ref 0–1)
WBC #/AREA URNS HPF: ABNORMAL /HPF (ref 0–5)
WBC OTHER # BLD: 9.4 K/UL (ref 3.5–11)

## 2023-08-04 PROCEDURE — 74177 CT ABD & PELVIS W/CONTRAST: CPT

## 2023-08-04 PROCEDURE — 80048 BASIC METABOLIC PNL TOTAL CA: CPT

## 2023-08-04 PROCEDURE — 36415 COLL VENOUS BLD VENIPUNCTURE: CPT

## 2023-08-04 PROCEDURE — 84484 ASSAY OF TROPONIN QUANT: CPT

## 2023-08-04 PROCEDURE — 6360000002 HC RX W HCPCS: Performed by: PHYSICIAN ASSISTANT

## 2023-08-04 PROCEDURE — 80076 HEPATIC FUNCTION PANEL: CPT

## 2023-08-04 PROCEDURE — 2580000003 HC RX 258: Performed by: EMERGENCY MEDICINE

## 2023-08-04 PROCEDURE — 85025 COMPLETE CBC W/AUTO DIFF WBC: CPT

## 2023-08-04 PROCEDURE — 81001 URINALYSIS AUTO W/SCOPE: CPT

## 2023-08-04 PROCEDURE — 83690 ASSAY OF LIPASE: CPT

## 2023-08-04 PROCEDURE — 96375 TX/PRO/DX INJ NEW DRUG ADDON: CPT | Performed by: EMERGENCY MEDICINE

## 2023-08-04 PROCEDURE — 99285 EMERGENCY DEPT VISIT HI MDM: CPT | Performed by: EMERGENCY MEDICINE

## 2023-08-04 PROCEDURE — 93005 ELECTROCARDIOGRAM TRACING: CPT | Performed by: PHYSICIAN ASSISTANT

## 2023-08-04 PROCEDURE — 6360000004 HC RX CONTRAST MEDICATION: Performed by: EMERGENCY MEDICINE

## 2023-08-04 PROCEDURE — 2580000003 HC RX 258: Performed by: PHYSICIAN ASSISTANT

## 2023-08-04 PROCEDURE — 96374 THER/PROPH/DIAG INJ IV PUSH: CPT | Performed by: EMERGENCY MEDICINE

## 2023-08-04 RX ORDER — KETOROLAC TROMETHAMINE 30 MG/ML
30 INJECTION, SOLUTION INTRAMUSCULAR; INTRAVENOUS ONCE
Status: COMPLETED | OUTPATIENT
Start: 2023-08-04 | End: 2023-08-04

## 2023-08-04 RX ORDER — ONDANSETRON 2 MG/ML
4 INJECTION INTRAMUSCULAR; INTRAVENOUS ONCE
Status: COMPLETED | OUTPATIENT
Start: 2023-08-04 | End: 2023-08-04

## 2023-08-04 RX ORDER — SODIUM CHLORIDE 0.9 % (FLUSH) 0.9 %
10 SYRINGE (ML) INJECTION PRN
Status: DISCONTINUED | OUTPATIENT
Start: 2023-08-04 | End: 2023-08-04 | Stop reason: HOSPADM

## 2023-08-04 RX ORDER — 0.9 % SODIUM CHLORIDE 0.9 %
500 INTRAVENOUS SOLUTION INTRAVENOUS ONCE
Status: COMPLETED | OUTPATIENT
Start: 2023-08-04 | End: 2023-08-04

## 2023-08-04 RX ORDER — 0.9 % SODIUM CHLORIDE 0.9 %
80 INTRAVENOUS SOLUTION INTRAVENOUS ONCE
Status: DISCONTINUED | OUTPATIENT
Start: 2023-08-04 | End: 2023-08-04 | Stop reason: HOSPADM

## 2023-08-04 RX ADMIN — KETOROLAC TROMETHAMINE 30 MG: 30 INJECTION, SOLUTION INTRAMUSCULAR; INTRAVENOUS at 14:00

## 2023-08-04 RX ADMIN — SODIUM CHLORIDE, PRESERVATIVE FREE 10 ML: 5 INJECTION INTRAVENOUS at 14:29

## 2023-08-04 RX ADMIN — IOPAMIDOL 75 ML: 755 INJECTION, SOLUTION INTRAVENOUS at 14:29

## 2023-08-04 RX ADMIN — SODIUM CHLORIDE 500 ML: 9 INJECTION, SOLUTION INTRAVENOUS at 14:07

## 2023-08-04 RX ADMIN — ONDANSETRON 4 MG: 2 INJECTION INTRAMUSCULAR; INTRAVENOUS at 13:59

## 2023-08-04 RX ADMIN — Medication 80 ML: at 14:30

## 2023-08-04 ASSESSMENT — PAIN DESCRIPTION - ORIENTATION: ORIENTATION: MID;LOWER

## 2023-08-04 ASSESSMENT — PAIN DESCRIPTION - DESCRIPTORS: DESCRIPTORS: PRESSURE

## 2023-08-04 ASSESSMENT — PAIN DESCRIPTION - LOCATION: LOCATION: ABDOMEN

## 2023-08-04 ASSESSMENT — PAIN SCALES - GENERAL: PAINLEVEL_OUTOF10: 1

## 2023-08-04 NOTE — ED PROVIDER NOTES
12 [facility] Emergency Department      Pt Name: Lakshmi Nash  MRN: [id number]  [address] [place] 1965  Date of evaluation: 8/4/2023    EMERGENCY DEPARTMENT ENCOUNTER      PERTINENT ATTENDING PHYSICIAN COMMENTS:      Faculty Attestation    I performed a history and physical examination of the patient and discussed management with the mid level provideer. I reviewed the mid level provider's note and agree with the documented findings and plan of care. Any areas of disagreement are noted on the chart. I was personally present for the key portions of any procedures. I have documented in the chart those procedures where I was not present during the key portions. I have reviewed the emergency nurses triage note. I agree with the chief complaint, past medical history, past surgical history, allergies, medications, social and family history as documented unless otherwise noted below. Documentation of the HPI, Physical Exam and Medical Decision Making performed by medical students or scribes is based on my personal performance of the HPI, PE and MDM. For Residents/Physician Assistant/ Nurse Practitioner cases/documentation I have personally evaluated this patient and have completed at least one if not all key elements of the E/M (history, physical exam, and MDM). Additional findings are as noted. CHIEF COMPLAINT       Chief Complaint   Patient presents with    Abdominal Pain     Had surgery Monday for hysterectomy, uterus, tubes removed, ect. Done by Dr. Marques Do at [facility]. Experiencing some abdominal pain, low back pain, light headed, and stated they had cloudy urine ealrier topay       HISTORY OF PRESENT ILLNESS    Lakshmi Nash is a 60 y.o. female who presents to the emergency department complaining of abdominal pain. She is status post hysterectomy this past Monday. She is having continued lower abdominal pain with radiation through to her back and lightheadedness. Denies any fevers.   Also noticed a rash over

## 2023-08-04 NOTE — DISCHARGE INSTRUCTIONS
Take you pain and nausea meds as indicated and prescribed as needed. Follow-up with your surgeon. PLEASE RETURN TO THE EMERGENCY DEPARTMENT IMMEDIATELY if your symptoms worsen in anyway or in 8-12 hours if not improved for re-evaluation. You should immediately return to the ER for symptoms such as increasing pain, bloody stool, fever, a feeling of passing out, light headed, dizziness, chest pain, shortness of breath, persistent nausea and/or vomiting, numbness or weakness to the arms or legs, coolness or color change of the arms or legs. Please understand that at this time there is no evidence for a more serious underlying process, but that early in the process of an illness or injury, an emergency department workup can be falsely reassuring. You should contact your family doctor within the next 24 hours for a follow up appointment    [address]!!!    From [facility]) and [facility] Emergency Services    On behalf of the Emergency Department staff at [facility]), I would like to thank you for giving us the opportunity to address your health care needs and concerns. We hope that during your visit, our service was delivered in a professional and caring manner. Please keep [facility] in mind as we walk with you down the path to your own personal wellness. Please expect an automated text message or email from us so we can ask a few questions about your health and progress. Based on your answers, a clinician may call you back to offer help and instructions. Please understand that early in the process of an illness or injury, an emergency department workup can be falsely reassuring. If you notice any worsening, changing or persistent symptoms please call your family doctor or return to the ER immediately. Tell us how we did during your visit at [email or web address]   and let us know about your experience

## 2023-08-05 LAB
EKG ATRIAL RATE: 70 BPM
EKG P AXIS: 60 DEGREES
EKG P-R INTERVAL: 164 MS
EKG Q-T INTERVAL: 410 MS
EKG QRS DURATION: 90 MS
EKG QTC CALCULATION (BAZETT): 442 MS
EKG R AXIS: 43 DEGREES
EKG T AXIS: 43 DEGREES
EKG VENTRICULAR RATE: 70 BPM

## 2023-08-05 NOTE — ED PROVIDER NOTES
[facility] Emergency Department  [address] [address] [facility] [address]  Phone: [phone]  Fax: [phone]        Pt Name: Elizabeth Maya  MRN: [id number]  [address] [place] 1965  Date of evaluation: 8/5/23    [address] [facility]       Chief Complaint   Patient presents with    Abdominal Pain     Had surgery Monday for hysterectomy, uterus, tubes removed, ect. Done by Dr. Candice Herbert at [facility] Experiencing some abdominal pain, low back pain, light headed, and stated they had cloudy urine ealrier top       HISTORY OF PRESENT ILLNESS (Location/Symptom, Timing/Onset, Context/Setting, Quality, Duration, Modifying Factors, Severity)      Elizabeth Maya is a 60 y.o. female with no pertinent PMH who presents to the ED via private auto with ***     PAST MEDICAL / SURGICAL / SOCIAL / FAMILY HISTORY     PMH:  has a past medical history of Anxiety, Hyperlipidemia, Kidney stones, Motion sickness, and Takotsubo syndrome. Surgical History:  has a past surgical history that includes Cholecystectomy; Colposcopy; bladder suspension; Tonsillectomy; eye surgery; Robotic assisted hysterectomy (N/A, 07/31/2023); Hysterectomy, vaginal (N/A, 7/31/2023); and Urethral Surgery (N/A, 7/31/2023). Social History:  reports that she has never smoked. She does not have any smokeless tobacco history on file. She reports current alcohol use. She reports that she does not use drugs. Family History: has no family status information on file. family history is not on file. Psychiatric History: None    Allergies: Latex, Oxycodone-acetaminophen, Amoxicillin, Codeine, Statins, and Percodan [oxycodone-aspirin]    Home Medications:   Prior to Admission medications    Medication Sig Start Date End Date Taking?  Authorizing Provider   ibuprofen (ADVIL;MOTRIN) 600 MG tablet Take 1 tablet by mouth every 6 hours as needed for Pain 7/31/23 8/30/23  Michelle Garcia,    HYDROcodone-acetaminophen (NORCO) 5-325 MG per tablet Take 1 tablet by

## 2023-08-16 ENCOUNTER — HOSPITAL ENCOUNTER
Dept: HOSPITAL 101 - LAB | Age: 58
Discharge: HOME | End: 2023-08-16
Payer: COMMERCIAL

## 2023-08-16 DIAGNOSIS — R39.9: Primary | ICD-10-CM

## 2023-08-16 LAB — GLUCOSE URINE UA: NEGATIVE MG/DL

## 2023-08-16 PROCEDURE — 81003 URINALYSIS AUTO W/O SCOPE: CPT

## 2023-08-16 PROCEDURE — 87086 URINE CULTURE/COLONY COUNT: CPT

## 2023-11-18 ENCOUNTER — HOSPITAL ENCOUNTER
Age: 58
Discharge: HOME | End: 2023-11-18
Payer: COMMERCIAL

## 2023-11-18 DIAGNOSIS — M79.10: ICD-10-CM

## 2023-11-18 DIAGNOSIS — U07.1: Primary | ICD-10-CM

## 2023-11-18 DIAGNOSIS — R61: ICD-10-CM

## 2023-11-18 DIAGNOSIS — R53.83: ICD-10-CM

## 2023-11-18 LAB
ADD MANUAL DIFF: NO
ALANINE AMINOTRANSFERASE: 36 U/L (ref 14–59)
ALBUMIN GLOBULIN RATIO: 1.1
ALBUMIN LEVEL: 3.4 G/DL (ref 3.4–5)
ALKALINE PHOSPHATASE: 65 U/L (ref 46–116)
ANION GAP: 11.4
ASPARTATE AMINO TRANSFERASE: 14 U/L (ref 15–37)
BLOOD UREA NITROGEN: 20 MG/DL (ref 7–18)
CALCIUM: 8.3 MG/DL (ref 8.5–10.1)
CARBON DIOXIDE: 28.8 MMOL/L (ref 21–32)
CHLORIDE: 103 MMOL/L (ref 98–107)
CO2 BLD-SCNC: 28.8 MMOL/L (ref 21–32)
CREATINE KINASE: 37 U/L (ref 26–192)
ESTIMATED GFR (AFRICAN AMERICA: >60 (ref 60–?)
ESTIMATED GFR (NON-AFRICAN AME: >60 (ref 60–?)
FOLATE: 14.4 NG/ML (ref 8.6–58.9)
FREE T3: 3.22 PG/ML (ref 2.18–3.98)
GLOBULIN: 3.2 G/DL
GLUCOSE BLD-MCNC: 105 MG/DL (ref 74–106)
HCT VFR BLD CALC: 42 % (ref 36–48)
HEMATOCRIT: 42 % (ref 36–48)
HEMOGLOBIN: 13.8 G/DL (ref 12–16)
IMMATURE GRANULOCYTES ABS AUTO: 0.17 10^3/UL (ref 0–0.03)
IMMATURE GRANULOCYTES PCT AUTO: 1.3 % (ref 0–0.5)
IRON: 75 UG/DL (ref 50–170)
LYMPHOCYTES  ABSOLUTE AUTO: 2.9 10^3/UL (ref 1.2–3.8)
MCV RBC: 88.2 FL (ref 81–99)
MEAN CORPUSCULAR HEMOGLOBIN: 29 PG (ref 26.7–34)
MEAN CORPUSCULAR HGB CONC: 32.9 G/DL (ref 29.9–35.2)
MEAN CORPUSCULAR VOLUME: 88.2 FL (ref 81–99)
MONO SCREEN: NEGATIVE
NT PRO B TYPE NATRIURETIC PEPT: 65 PG/ML (ref ?–900)
PLATELET # BLD: 350 10^3/UL (ref 150–450)
PLATELET COUNT: 350 10^3/UL (ref 150–450)
POTASSIUM SERPLBLD-SCNC: 4.2 MMOL/L (ref 3.5–5.1)
POTASSIUM: 4.2 MMOL/L (ref 3.5–5.1)
RED BLOOD COUNT: 4.76 10^6/UL (ref 4.2–5.4)
SODIUM BLD-SCNC: 139 MMOL/L (ref 136–145)
SODIUM: 139 MMOL/L (ref 136–145)
THYROID STIMULATING HORMONE: 1.47 UIU/ML (ref 0.36–3.74)
TOTAL PROTEIN: 6.6 G/DL (ref 6.4–8.2)
VITAMIN B12: 440 PG/ML (ref 193–986)
WBC # BLD: 12.8 10^3/UL (ref 4–11)
WHITE BLOOD COUNT: 12.8 10^3/UL (ref 4–11)

## 2023-11-18 PROCEDURE — 83540 ASSAY OF IRON: CPT

## 2023-11-18 PROCEDURE — 85025 COMPLETE CBC W/AUTO DIFF WBC: CPT

## 2023-11-18 PROCEDURE — 84481 FREE ASSAY (FT-3): CPT

## 2023-11-18 PROCEDURE — 36415 COLL VENOUS BLD VENIPUNCTURE: CPT

## 2023-11-18 PROCEDURE — 82607 VITAMIN B-12: CPT

## 2023-11-18 PROCEDURE — 84436 ASSAY OF TOTAL THYROXINE: CPT

## 2023-11-18 PROCEDURE — 86308 HETEROPHILE ANTIBODY SCREEN: CPT

## 2023-11-18 PROCEDURE — 80053 COMPREHEN METABOLIC PANEL: CPT

## 2023-11-18 PROCEDURE — 82746 ASSAY OF FOLIC ACID SERUM: CPT

## 2023-11-18 PROCEDURE — 82550 ASSAY OF CK (CPK): CPT

## 2023-11-18 PROCEDURE — 83036 HEMOGLOBIN GLYCOSYLATED A1C: CPT

## 2023-11-18 PROCEDURE — 82306 VITAMIN D 25 HYDROXY: CPT

## 2023-11-18 PROCEDURE — 83880 ASSAY OF NATRIURETIC PEPTIDE: CPT

## 2023-11-18 PROCEDURE — 84443 ASSAY THYROID STIM HORMONE: CPT

## 2023-11-18 PROCEDURE — 83525 ASSAY OF INSULIN: CPT

## 2023-11-20 LAB — EBV NUCLEAR ANTIGEN AB, IGG: <18 U/ML (ref 0–17.9)

## 2024-02-02 ENCOUNTER — HOSPITAL ENCOUNTER
Dept: HOSPITAL 101 - LAB | Age: 59
Discharge: HOME | End: 2024-02-02
Payer: COMMERCIAL

## 2024-02-02 ENCOUNTER — HOSPITAL ENCOUNTER
Age: 59
Discharge: HOME | End: 2024-02-02
Payer: COMMERCIAL

## 2024-02-02 DIAGNOSIS — R31.29: Primary | ICD-10-CM

## 2024-02-02 DIAGNOSIS — N20.0: ICD-10-CM

## 2024-02-02 DIAGNOSIS — R30.0: ICD-10-CM

## 2024-02-02 DIAGNOSIS — R10.9: ICD-10-CM

## 2024-02-02 DIAGNOSIS — R19.7: ICD-10-CM

## 2024-02-02 LAB
BLOOD UREA NITROGEN: 11 MG/DL (ref 7–18)
ESTIMATED GFR (AFRICAN AMERICA: >60 (ref 60–?)
ESTIMATED GFR (NON-AFRICAN AME: >60 (ref 60–?)
GLUCOSE URINE UA: NEGATIVE MG/DL
URINE CULTURE INDICATED: (no result)

## 2024-02-02 PROCEDURE — 87150 DNA/RNA AMPLIFIED PROBE: CPT

## 2024-02-02 PROCEDURE — 87046 STOOL CULTR AEROBIC BACT EA: CPT

## 2024-02-02 PROCEDURE — 87186 SC STD MICRODIL/AGAR DIL: CPT

## 2024-02-02 PROCEDURE — 83993 ASSAY FOR CALPROTECTIN FECAL: CPT

## 2024-02-02 PROCEDURE — 87329 GIARDIA AG IA: CPT

## 2024-02-02 PROCEDURE — 82565 ASSAY OF CREATININE: CPT

## 2024-02-02 PROCEDURE — 87086 URINE CULTURE/COLONY COUNT: CPT

## 2024-02-02 PROCEDURE — 84520 ASSAY OF UREA NITROGEN: CPT

## 2024-02-02 PROCEDURE — 87045 FECES CULTURE AEROBIC BACT: CPT

## 2024-02-02 PROCEDURE — 36415 COLL VENOUS BLD VENIPUNCTURE: CPT

## 2024-02-02 PROCEDURE — 84376 SUGARS SINGLE QUAL: CPT

## 2024-02-02 PROCEDURE — 87493 C DIFF AMPLIFIED PROBE: CPT

## 2024-02-02 PROCEDURE — 87427 SHIGA-LIKE TOXIN AG IA: CPT

## 2024-02-02 PROCEDURE — 83631 LACTOFERRIN FECAL (QUANT): CPT

## 2024-02-02 PROCEDURE — 81001 URINALYSIS AUTO W/SCOPE: CPT

## 2024-02-02 PROCEDURE — 82533 TOTAL CORTISOL: CPT

## 2024-02-03 LAB — C. DIFFICILE PCR: NEGATIVE

## 2024-02-07 LAB — CALPROTECTIN, FECAL: 12 UG/G (ref 0–120)

## 2024-02-08 LAB — LACTOFERRIN, FECAL, QUANT.: <1 UG/ML(G) (ref 0–7.24)

## 2024-02-10 ENCOUNTER — HOSPITAL ENCOUNTER
Dept: HOSPITAL 101 - LAB | Age: 59
Discharge: HOME | End: 2024-02-10
Payer: COMMERCIAL

## 2024-02-10 DIAGNOSIS — R53.83: Primary | ICD-10-CM

## 2024-02-10 PROCEDURE — 36415 COLL VENOUS BLD VENIPUNCTURE: CPT

## 2024-02-10 PROCEDURE — 82626 DEHYDROEPIANDROSTERONE: CPT

## 2024-02-10 PROCEDURE — 82024 ASSAY OF ACTH: CPT

## 2024-02-10 PROCEDURE — 82533 TOTAL CORTISOL: CPT

## 2024-02-16 LAB — DHEA, SERUM: 182 NG/DL (ref 21–402)

## 2024-02-21 ENCOUNTER — HOSPITAL ENCOUNTER
Dept: HOSPITAL 101 - CT | Age: 59
Discharge: HOME | End: 2024-02-21
Payer: COMMERCIAL

## 2024-02-21 DIAGNOSIS — K44.9: ICD-10-CM

## 2024-02-21 DIAGNOSIS — R10.9: ICD-10-CM

## 2024-02-21 DIAGNOSIS — R31.29: Primary | ICD-10-CM

## 2024-02-21 PROCEDURE — 74178 CT ABD&PLV WO CNTR FLWD CNTR: CPT

## 2024-03-13 PROBLEM — E78.5 HYPERLIPIDEMIA: Status: ACTIVE | Noted: 2024-03-13

## 2024-03-13 PROBLEM — I51.81 TAKOTSUBO SYNDROME: Status: ACTIVE | Noted: 2024-03-13

## 2024-03-13 RX ORDER — EZETIMIBE 10 MG/1
1 TABLET ORAL NIGHTLY
COMMUNITY

## 2024-03-13 RX ORDER — ASPIRIN 81 MG/1
1 TABLET ORAL DAILY
COMMUNITY

## 2024-03-13 RX ORDER — LORATADINE 10 MG/1
1 TABLET ORAL DAILY
COMMUNITY
End: 2024-05-30 | Stop reason: SINTOL

## 2024-05-13 ENCOUNTER — HOSPITAL ENCOUNTER
Dept: HOSPITAL 101 - MRI | Age: 59
Discharge: HOME | End: 2024-05-13
Payer: COMMERCIAL

## 2024-05-13 DIAGNOSIS — Q27.30: ICD-10-CM

## 2024-05-13 DIAGNOSIS — G43.909: Primary | ICD-10-CM

## 2024-05-13 PROCEDURE — 70551 MRI BRAIN STEM W/O DYE: CPT

## 2024-05-28 ENCOUNTER — HOSPITAL ENCOUNTER
Dept: HOSPITAL 101 - LAB | Age: 59
Discharge: HOME | End: 2024-05-28
Payer: COMMERCIAL

## 2024-05-28 DIAGNOSIS — E27.9: ICD-10-CM

## 2024-05-28 DIAGNOSIS — R79.89: Primary | ICD-10-CM

## 2024-05-28 LAB
ANION GAP: 12.5
BLOOD UREA NITROGEN: 17 MG/DL (ref 7–18)
CALCIUM: 9 MG/DL (ref 8.5–10.1)
CARBON DIOXIDE: 29.5 MMOL/L (ref 21–32)
CHLORIDE: 103 MMOL/L (ref 98–107)
ESTIMATED GFR (AFRICAN AMERICA: >60 (ref 60–?)
ESTIMATED GFR (NON-AFRICAN AME: >60 (ref 60–?)
GLUCOSE: 100 MG/DL (ref 74–106)
POTASSIUM: 4 MMOL/L (ref 3.5–5.1)
SODIUM: 141 MMOL/L (ref 136–145)

## 2024-05-28 PROCEDURE — 82024 ASSAY OF ACTH: CPT

## 2024-05-28 PROCEDURE — 36415 COLL VENOUS BLD VENIPUNCTURE: CPT

## 2024-05-28 PROCEDURE — 82533 TOTAL CORTISOL: CPT

## 2024-05-28 PROCEDURE — 82088 ASSAY OF ALDOSTERONE: CPT

## 2024-05-28 PROCEDURE — 82530 CORTISOL FREE: CPT

## 2024-05-28 PROCEDURE — 80048 BASIC METABOLIC PNL TOTAL CA: CPT

## 2024-05-28 PROCEDURE — 84244 ASSAY OF RENIN: CPT

## 2024-05-30 ENCOUNTER — HOSPITAL ENCOUNTER
Dept: HOSPITAL 101 - MRI | Age: 59
Discharge: HOME | End: 2024-05-30
Payer: COMMERCIAL

## 2024-05-30 ENCOUNTER — OFFICE VISIT (OUTPATIENT)
Dept: CARDIOLOGY | Facility: CLINIC | Age: 59
End: 2024-05-30
Payer: COMMERCIAL

## 2024-05-30 VITALS
DIASTOLIC BLOOD PRESSURE: 88 MMHG | WEIGHT: 174 LBS | BODY MASS INDEX: 29.71 KG/M2 | SYSTOLIC BLOOD PRESSURE: 126 MMHG | HEART RATE: 74 BPM | HEIGHT: 64 IN

## 2024-05-30 DIAGNOSIS — E78.2 MIXED HYPERLIPIDEMIA: ICD-10-CM

## 2024-05-30 DIAGNOSIS — Q27.30: Primary | ICD-10-CM

## 2024-05-30 DIAGNOSIS — Z01.810 PREOP CARDIOVASCULAR EXAM: ICD-10-CM

## 2024-05-30 DIAGNOSIS — I51.81 TAKOTSUBO SYNDROME: Primary | ICD-10-CM

## 2024-05-30 PROCEDURE — 3008F BODY MASS INDEX DOCD: CPT | Performed by: INTERNAL MEDICINE

## 2024-05-30 PROCEDURE — A9575 INJ GADOTERATE MEGLUMI 0.1ML: HCPCS

## 2024-05-30 PROCEDURE — 1036F TOBACCO NON-USER: CPT | Performed by: INTERNAL MEDICINE

## 2024-05-30 PROCEDURE — 93000 ELECTROCARDIOGRAM COMPLETE: CPT | Performed by: INTERNAL MEDICINE

## 2024-05-30 PROCEDURE — 70552 MRI BRAIN STEM W/DYE: CPT

## 2024-05-30 PROCEDURE — 99214 OFFICE O/P EST MOD 30 MIN: CPT | Performed by: INTERNAL MEDICINE

## 2024-05-30 RX ORDER — LORATADINE AND PSEUDOEPHEDRINE SULFATE 5; 120 MG/1; MG/1
1 TABLET, EXTENDED RELEASE ORAL EVERY 12 HOURS PRN
COMMUNITY
Start: 2024-05-22

## 2024-05-30 RX ORDER — LANSOPRAZOLE 30 MG/1
30 CAPSULE, DELAYED RELEASE ORAL DAILY
COMMUNITY
Start: 2024-03-01

## 2024-05-30 ASSESSMENT — ENCOUNTER SYMPTOMS: DYSPNEA ON EXERTION: 1

## 2024-05-30 NOTE — LETTER
May 30, 2024     Tyrone Isaac MD  [address]  [facility] [address]    Patient: Ysabel Amaral   YOB: 1965   Date of Visit: 5/30/2024       Dear Dr. Tyrone Isaac MD:    Thank you for referring Ysabel Amaral to me for evaluation. Below are my notes for this consultation.  If you have questions, please do not hesitate to call me. I look forward to following your patient along with you.       Sincerely,     Jacob Henry MD      CC: No Recipients  ______________________________________________________________________________________    Subjective   Ysabel Amaral is a 59 y.o. female       Chief Complaint    Follow-up; Pre-op Clearance          HPI        Patient is here for follow-up continue management for previous presentation with Takotsubo cardiomyopathy, hyperlipidemia, obesity and for preoperative risk assessment for GYN surgery/procedure.  Since last time I saw her she reports is feeling well.  She denies any cardiac complaint.  She underwent previous surgery for uterine prolapse and bladder repair without any cardiac issues or complication.  Apparently she is scheduled to undergo a graft in the near future.  Cardiac wise she denies complaint of chest pain, palpitation, lightheadedness, dizziness or syncope.    ASSESSMENT:      1. Prior presentation with stress cardiomyopathy/takotsubo syndrome, echocardiogram subsequently showed normal ejection fraction. She denies any symptoms and describe functional class I.  No recurrence  2. Hyperlipidemia with documented history of intolerance to statin.  Has not been compliant with her Zetia.  Has not had any lipid profile  3. Mildly overweight. With no significant weight changes  4.  preoperative risk assessment for hysterectomy. Patient is functional class I. She has normal LV systolic function and no coronary artery disease. Her operative risk is acceptable  5.  Patient is scheduled to undergo a sleep  "study in the near future  6.  Recent finding of adrenal adenoma seen by endocrinology     Plan     1. Patient will remain on the same medication.  And I advised her to be compliant with her Zetia  2. Patient was counseled regarding risk factor modification  3. We'll see him back in the office in 1 year I.  I advised her to have a left fasting lipid profile  4. The patient was advised to lose weight and exercise.  5. I reviewed with patient preoperative cardiac risk for her upcoming surgery. I believe her operative risk is acceptable and she can proceed she was given permission to hold aspirin for 7 days prior to surgery   Review of Systems   Constitutional: Positive for malaise/fatigue.   Cardiovascular:  Positive for dyspnea on exertion.   All other systems reviewed and are negative.           Vitals:    05/30/24 0930   BP: 126/88   BP Location: Right arm   Patient Position: Sitting   Pulse: 74   Weight: 78.9 kg (174 lb)   Height: 1.626 m (5' 4\")      EKG done in office today    Objective   Physical Exam  Constitutional:       Appearance: Normal appearance.   HENT:      Nose: Nose normal.   Neck:      Vascular: No carotid bruit.   Cardiovascular:      Rate and Rhythm: Normal rate.      Pulses: Normal pulses.      Heart sounds: Normal heart sounds.   Pulmonary:      Effort: Pulmonary effort is normal.   Abdominal:      General: Bowel sounds are normal.      Palpations: Abdomen is soft.   Musculoskeletal:         General: Normal range of motion.      Cervical back: Normal range of motion.      Right lower leg: No edema.      Left lower leg: No edema.   Skin:     General: Skin is warm and dry.   Neurological:      General: No focal deficit present.      Mental Status: She is alert.   Psychiatric:         Mood and Affect: Mood normal.         Behavior: Behavior normal.         Thought Content: Thought content normal.         Judgment: Judgment normal.         Allergies  Nitrofurantoin monohyd/m-cryst, Amoxicillin, " Atorvastatin, Ezetimibe, Oxycodone, Pravastatin, Statins-hmg-coa reductase inhibitors, and Ciprofloxacin     Current Medications    Current Outpatient Medications:   •  aspirin 81 mg EC tablet, Take 1 tablet (81 mg) by mouth once daily., Disp: , Rfl:   •  ezetimibe (Zetia) 10 mg tablet, Take 1 tablet (10 mg) by mouth once daily at bedtime., Disp: , Rfl:   •  lansoprazole (Prevacid) 30 mg DR capsule, 1 capsule (30 mg) once daily., Disp: , Rfl:   •  loratadine-pseudoephedrine (Claritin-D 12 Hour) 5-120 mg 12 hr tablet, Take 1 tablet by mouth every 12 hours if needed., Disp: , Rfl:                      Assessment/Plan   1. Takotsubo syndrome  Follow Up In Cardiology    ECG 12 Lead      2. Mixed hyperlipidemia  Lipid Panel    Lipid Panel      3. BMI 29.0-29.9,adult        4. Preop cardiovascular exam          Ysabel Amaral is clear for surgery from a cardiac standpoint      Scribe Attestation  By signing my name below, IFernanda LPN   , [employer]   attest that this documentation has been prepared under the direction and in the presence of Jacob Henry MD.     Provider Attestation - Scribe documentation    All medical record entries made by the Scribe were at my direction and personally dictated by me. I have reviewed the chart and agree that the record accurately reflects my personal performance of the history, physical exam, discussion and plan.

## 2024-05-30 NOTE — PROGRESS NOTES
Subjective   Ysabel Amaral is a 59 y.o. female       Chief Complaint    Follow-up; Pre-op Clearance          HPI        Patient is here for follow-up continue management for previous presentation with Takotsubo cardiomyopathy, hyperlipidemia, obesity and for preoperative risk assessment for GYN surgery/procedure.  Since last time I saw her she reports is feeling well.  She denies any cardiac complaint.  She underwent previous surgery for uterine prolapse and bladder repair without any cardiac issues or complication.  Apparently she is scheduled to undergo a graft in the near future.  Cardiac wise she denies complaint of chest pain, palpitation, lightheadedness, dizziness or syncope.    ASSESSMENT:      1. Prior presentation with stress cardiomyopathy/takotsubo syndrome, echocardiogram subsequently showed normal ejection fraction. She denies any symptoms and describe functional class I.  No recurrence  2. Hyperlipidemia with documented history of intolerance to statin.  Has not been compliant with her Zetia.  Has not had any lipid profile  3. Mildly overweight. With no significant weight changes  4.  preoperative risk assessment for hysterectomy. Patient is functional class I. She has normal LV systolic function and no coronary artery disease. Her operative risk is acceptable  5.  Patient is scheduled to undergo a sleep study in the near future  6.  Recent finding of adrenal adenoma seen by endocrinology     Plan     1. Patient will remain on the same medication.  And I advised her to be compliant with her Zetia  2. Patient was counseled regarding risk factor modification  3. We'll see him back in the office in 1 year I.  I advised her to have a left fasting lipid profile  4. The patient was advised to lose weight and exercise.  5. I reviewed with patient preoperative cardiac risk for her upcoming surgery. I believe her operative risk is acceptable and she can proceed she was given permission to hold aspirin for 7  "days prior to surgery   Review of Systems   Constitutional: Positive for malaise/fatigue.   Cardiovascular:  Positive for dyspnea on exertion.   All other systems reviewed and are negative.           Vitals:    05/30/24 0930   BP: 126/88   BP Location: Right arm   Patient Position: Sitting   Pulse: 74   Weight: 78.9 kg (174 lb)   Height: 1.626 m (5' 4\")      EKG done in office today    Objective   Physical Exam  Constitutional:       Appearance: Normal appearance.   HENT:      Nose: Nose normal.   Neck:      Vascular: No carotid bruit.   Cardiovascular:      Rate and Rhythm: Normal rate.      Pulses: Normal pulses.      Heart sounds: Normal heart sounds.   Pulmonary:      Effort: Pulmonary effort is normal.   Abdominal:      General: Bowel sounds are normal.      Palpations: Abdomen is soft.   Musculoskeletal:         General: Normal range of motion.      Cervical back: Normal range of motion.      Right lower leg: No edema.      Left lower leg: No edema.   Skin:     General: Skin is warm and dry.   Neurological:      General: No focal deficit present.      Mental Status: She is alert.   Psychiatric:         Mood and Affect: Mood normal.         Behavior: Behavior normal.         Thought Content: Thought content normal.         Judgment: Judgment normal.         Allergies  Nitrofurantoin monohyd/m-cryst, Amoxicillin, Atorvastatin, Ezetimibe, Oxycodone, Pravastatin, Statins-hmg-coa reductase inhibitors, and Ciprofloxacin     Current Medications    Current Outpatient Medications:     aspirin 81 mg EC tablet, Take 1 tablet (81 mg) by mouth once daily., Disp: , Rfl:     ezetimibe (Zetia) 10 mg tablet, Take 1 tablet (10 mg) by mouth once daily at bedtime., Disp: , Rfl:     lansoprazole (Prevacid) 30 mg DR capsule, 1 capsule (30 mg) once daily., Disp: , Rfl:     loratadine-pseudoephedrine (Claritin-D 12 Hour) 5-120 mg 12 hr tablet, Take 1 tablet by mouth every 12 hours if needed., Disp: , Rfl:          "             Assessment/Plan   1. Takotsubo syndrome  Follow Up In Cardiology    ECG 12 Lead      2. Mixed hyperlipidemia  Lipid Panel    Lipid Panel      3. BMI 29.0-29.9,adult        4. Preop cardiovascular exam          Ysabel Amaral is clear for surgery from a cardiac standpoint      Scribe Attestation  By signing my name below, I, Fernanda GARAY LPN   , Scribe   attest that this documentation has been prepared under the direction and in the presence of Jacob Henry MD.     Provider Attestation - Scribe documentation    All medical record entries made by the Scribe were at my direction and personally dictated by me. I have reviewed the chart and agree that the record accurately reflects my personal performance of the history, physical exam, discussion and plan.

## 2024-05-30 NOTE — LETTER
May 30, 2024     Colt Richardson DO  [address] [facility]  [facility] [place] [address]    Patient: Ysabel Amaral   YOB: 1965   Date of Visit: 5/30/2024       Dear Dr. Colt Richardson, :    Thank you for referring Ysabel Amaral to me for evaluation. Below are my notes for this consultation.  If you have questions, please do not hesitate to call me. I look forward to following your patient along with you.       Sincerely,     Jacob Henry MD      CC: No Recipients  ______________________________________________________________________________________    Subjective   Ysabel Amaral is a 59 y.o. female       Chief Complaint    Follow-up; Pre-op Clearance          HPI        Patient is here for follow-up continue management for previous presentation with Takotsubo cardiomyopathy, hyperlipidemia, obesity and for preoperative risk assessment for GYN surgery/procedure.  Since last time I saw her she reports is feeling well.  She denies any cardiac complaint.  She underwent previous surgery for uterine prolapse and bladder repair without any cardiac issues or complication.  Apparently she is scheduled to undergo a graft in the near future.  Cardiac wise she denies complaint of chest pain, palpitation, lightheadedness, dizziness or syncope.    ASSESSMENT:      1. Prior presentation with stress cardiomyopathy/takotsubo syndrome, echocardiogram subsequently showed normal ejection fraction. She denies any symptoms and describe functional class I.  No recurrence  2. Hyperlipidemia with documented history of intolerance to statin.  Has not been compliant with her Zetia.  Has not had any lipid profile  3. Mildly overweight. With no significant weight changes  4.  preoperative risk assessment for hysterectomy. Patient is functional class I. She has normal LV systolic function and no coronary artery disease. Her operative risk is acceptable  5.  Patient is scheduled to undergo a sleep  "study in the near future  6.  Recent finding of adrenal adenoma seen by endocrinology     Plan     1. Patient will remain on the same medication.  And I advised her to be compliant with her Zetia  2. Patient was counseled regarding risk factor modification  3. We'll see him back in the office in 1 year I.  I advised her to have a left fasting lipid profile  4. The patient was advised to lose weight and exercise.  5. I reviewed with patient preoperative cardiac risk for her upcoming surgery. I believe her operative risk is acceptable and she can proceed she was given permission to hold aspirin for 7 days prior to surgery   Review of Systems   Constitutional: Positive for malaise/fatigue.   Cardiovascular:  Positive for dyspnea on exertion.   All other systems reviewed and are negative.           Vitals:    05/30/24 0930   BP: 126/88   BP Location: Right arm   Patient Position: Sitting   Pulse: 74   Weight: 78.9 kg (174 lb)   Height: 1.626 m (5' 4\")      EKG done in office today    Objective   Physical Exam  Constitutional:       Appearance: Normal appearance.   HENT:      Nose: Nose normal.   Neck:      Vascular: No carotid bruit.   Cardiovascular:      Rate and Rhythm: Normal rate.      Pulses: Normal pulses.      Heart sounds: Normal heart sounds.   Pulmonary:      Effort: Pulmonary effort is normal.   Abdominal:      General: Bowel sounds are normal.      Palpations: Abdomen is soft.   Musculoskeletal:         General: Normal range of motion.      Cervical back: Normal range of motion.      Right lower leg: No edema.      Left lower leg: No edema.   Skin:     General: Skin is warm and dry.   Neurological:      General: No focal deficit present.      Mental Status: She is alert.   Psychiatric:         Mood and Affect: Mood normal.         Behavior: Behavior normal.         Thought Content: Thought content normal.         Judgment: Judgment normal.         Allergies  Nitrofurantoin monohyd/m-cryst, Amoxicillin, " Atorvastatin, Ezetimibe, Oxycodone, Pravastatin, Statins-hmg-coa reductase inhibitors, and Ciprofloxacin     Current Medications    Current Outpatient Medications:   •  aspirin 81 mg EC tablet, Take 1 tablet (81 mg) by mouth once daily., Disp: , Rfl:   •  ezetimibe (Zetia) 10 mg tablet, Take 1 tablet (10 mg) by mouth once daily at bedtime., Disp: , Rfl:   •  lansoprazole (Prevacid) 30 mg DR capsule, 1 capsule (30 mg) once daily., Disp: , Rfl:   •  loratadine-pseudoephedrine (Claritin-D 12 Hour) 5-120 mg 12 hr tablet, Take 1 tablet by mouth every 12 hours if needed., Disp: , Rfl:                      Assessment/Plan   1. Takotsubo syndrome  Follow Up In Cardiology    ECG 12 Lead      2. Mixed hyperlipidemia  Lipid Panel    Lipid Panel      3. BMI 29.0-29.9,adult        4. Preop cardiovascular exam          Ysabel Amaral is clear for surgery from a cardiac standpoint      Scribe Attestation  By signing my name below, IFernanda LPN   , Sunshine[place]   attest that this documentation has been prepared under the direction and in the presence of Jacob Henry MD.     Provider Attestation - Scribe documentation    All medical record entries made by the Scribe were at my direction and personally dictated by me. I have reviewed the chart and agree that the record accurately reflects my personal performance of the history, physical exam, discussion and plan.

## 2024-05-31 LAB — Lab: 11.8 NG/DL (ref 0–30)

## 2024-06-01 ENCOUNTER — HOSPITAL ENCOUNTER
Dept: HOSPITAL 101 - SLEEP | Age: 59
Discharge: HOME | End: 2024-06-01
Payer: COMMERCIAL

## 2024-06-01 DIAGNOSIS — G47.33: Primary | ICD-10-CM

## 2024-06-01 PROCEDURE — 95810 POLYSOM 6/> YRS 4/> PARAM: CPT

## 2024-06-03 ENCOUNTER — HOSPITAL ENCOUNTER
Dept: HOSPITAL 101 - LAB | Age: 59
Discharge: HOME | End: 2024-06-03
Payer: COMMERCIAL

## 2024-06-03 DIAGNOSIS — N81.10: Primary | ICD-10-CM

## 2024-06-03 LAB
ADD MANUAL DIFF: NO
HEMATOCRIT: 37.1 % (ref 36–48)
HEMOGLOBIN: 11.9 G/DL (ref 12–16)
IMMATURE GRANULOCYTES ABS AUTO: 0.01 10^3/UL (ref 0–0.03)
IMMATURE GRANULOCYTES PCT AUTO: 0.2 % (ref 0–0.5)
LYMPHOCYTES  ABSOLUTE AUTO: 2 10^3/UL (ref 1.2–3.8)
MCV RBC: 87.5 FL (ref 81–99)
MEAN CORPUSCULAR HEMOGLOBIN: 28.1 PG (ref 26.7–34)
MEAN CORPUSCULAR HGB CONC: 32.1 G/DL (ref 29.9–35.2)
PLATELET COUNT: 325 10^3/UL (ref 150–450)
RED BLOOD COUNT: 4.24 10^6/UL (ref 4.2–5.4)
WHITE BLOOD COUNT: 6.6 10^3/UL (ref 4–11)

## 2024-06-03 PROCEDURE — 36415 COLL VENOUS BLD VENIPUNCTURE: CPT

## 2024-06-03 PROCEDURE — 85025 COMPLETE CBC W/AUTO DIFF WBC: CPT

## 2024-06-03 RX ORDER — LORATADINE 10 MG/1
10 CAPSULE, LIQUID FILLED ORAL DAILY PRN
COMMUNITY

## 2024-06-03 NOTE — PROGRESS NOTES
DAY OF SURGERY/PROCEDURE  GUIDELINES    As a patient at the [facility], you can expect quality medical and nursing care that is centered on your individual needs. It is our goal to make your surgical experience as comfortable and excellent as possible.  ________________________________________________________________________    The following instructions are general guidelines, if any information on this sheet is different from what your doctor has instructed you to do, please follow your doctor's instructions.    Please arrive on 6/10 @ 600 am      Enter through entrance C. Check in at registration     Upon arrival you will be taken to the pre-operative area to get ready for surgery, your family will stay in the waiting room and visit with you once you are ready for surgery. Due to special limitations please limit visitation to 1-2 members of your family at a time. When it is time for surgery your family will return to the waiting room.    Nothing to eat, drink, smoke, suck or chew after midnight (no water, gum, mints, cigarettes, cigars, pipes, snuff, chewing tobacco, etc.) or your surgery may be canceled.     Take a shower or bath on the morning of your surgery/procedure (Hibiclens if directed) Do not apply any lotions.    Brush your teeth, but do not swallow any water    IN CASE OF ILLNESS - If you have a cold or flu symptoms (high fever, runny nose, sore throat, cough, etc.) rash, nausea, vomiting, loose stools, and/or recent contact with someone who has a contagious disease (chick pox, measles, etc.) please call your doctor before coming to the surgery center    Take a small sip of water with heart, blood pressure, and/or seizure medication the morning of surgery. Prevacid    DO NOT take anticoagulants (blood thinners, aspirin or aspirin-containing products) as instructed by your physician.    Leave all jewelry at home and wear loose, comfortable clothing that is easy to put on and take

## 2024-06-06 NOTE — DISCHARGE INSTRUCTIONS
Drink 8 glasses of fluid daily. Add PlumSmart juice.   Metamucil, FiberCon, or other bulking medication - use as directed   Milk of magnesia - 30 mL's by mouth every 12 hours   Dulcolax suppository - 1 suppository per rectum every 4-6 hours   Fleets enema - use as directed unless told nothing per rectum (colorectal fistula repair)      BLADDER DRAINAGE     There is a >50% chance you will void on your own post-operatively. In particular, reconstructive and incontinence surgeries (whether you had incontinence before surgery or not), sometimes have surgical effects of normal swelling and new positioning of the bladder may be associated with some mild to moderate postoperative urinary leakage. Often this leakage is urge related. As discussed, pre-operatively, up to 26% of patients with prolapse and negative urodynamic testing may develop “de sari” incontinence afterwards.     Please do not be frustrated if temporary incontinence occurs for it will most likely improve as you continue to heal. Leakage rarely persists in the long-term, there are many options for treatment, and Dr. Hunter and his office staff are there to help you every step of the way.                        6                          BLADDER DRAINAGE FOR THOSE PATIENTS REQUIRING A CATHETER      For some postoperative patients, it may be hard for you to urinate for a few days or weeks.  Up to 30-40% of women cannot urinate efficiently after surgery due to swelling or anesthesia.  This may last a few hours to a few weeks.       You may be required to use a catheter in your bladder to help it drain and retrain it to work properly.  The most commonly used type of catheter, called an Intraurethral Lozano Catheter (IUC), is placed into the bladder through the urethra.  This is usually reserved for large pelvic reconstructive surgeries and timely recovery.  Other tubes may help drain fluid from your incision/s.            Unless instructed to maintain a catheter

## 2024-06-07 ENCOUNTER — ANESTHESIA EVENT (OUTPATIENT)
Dept: OPERATING ROOM | Age: 59
End: 2024-06-07
Payer: COMMERCIAL

## 2024-06-09 NOTE — H&P
discussed.    OBSTETRICAL HISTORY:   OB History    Para Term  AB Living   2 2 0 0 0 2   SAB IAB Ectopic Molar Multiple Live Births   0 0 0 0 0 0      # Outcome Date GA Lbr Tapan/2nd Weight Sex Delivery Anes PTL Lv   2 Para            1 Para                PAST MEDICAL HISTORY:   has a past medical history of Anxiety, Hyperlipidemia, Kidney stones, Motion sickness, and Takotsubo syndrome.    PAST SURGICAL HISTORY:   has a past surgical history that includes Cholecystectomy; Colposcopy; bladder suspension; Tonsillectomy; eye surgery; Robotic assisted hysterectomy (N/A, 2023); Hysterectomy, vaginal (N/A, 2023); and Urethral Surgery (N/A, 2023).    ALLERGIES:  Allergies as of 2024 - Fully Reviewed 2023   Allergen Reaction Noted    Latex Itching and Rash 2023    Oxycodone-acetaminophen Nausea And Vomiting 2023    Amoxicillin Hives 2012    Codeine Hives 2012    Statins Myalgia 2023    Percodan [oxycodone-aspirin] Nausea And Vomiting 2012       MEDICATIONS:  Current Facility-Administered Medications   Medication Dose Route Frequency Provider Last Rate Last Admin    ceFAZolin (ANCEF) 2,000 mg in sodium chloride 0.9 % 50 mL IVPB (mini-bag)  2,000 mg IntraVENous Once Kat Zuniga,         lidocaine PF 1 % injection 1 mL  1 mL IntraDERmal Once PRN Hong Caraballo MD        lactated ringers IV soln infusion   IntraVENous Continuous Hong Caraballo  mL/hr at 06/10/24 0624 New Bag at 06/10/24 0624    sodium chloride flush 0.9 % injection 5-40 mL  5-40 mL IntraVENous 2 times per day Hong Caraballo MD        sodium chloride flush 0.9 % injection 5-40 mL  5-40 mL IntraVENous PRN Hong Caraballo MD        0.9 % sodium chloride infusion   IntraVENous PRN Hong Caraballo MD        ceFAZolin (ANCEF) 2 g injection                FAMILY HISTORY:  Denies pertinent family history    SOCIAL HISTORY:   reports that she has never smoked. She does not have

## 2024-06-09 NOTE — DISCHARGE SUMMARY
Urogynecology Discharge Summary  [facility]      Patient Name: Annalisa Nance  Patient : 1965  Primary Care Physician: Phillip Obregon MD  Admit Date: 6/10/2024    Principal Diagnosis: Cystocele    Other Diagnosis:   Cystocele, unspecified [N81.10]  Patient Active Problem List   Diagnosis    Uterine prolapse    Cystocele with rectocele    Stress incontinence of urine    S/p RALH, BSO, A/P repair, Cysto, Bulkamid 23    S/P anterior colporrhaphy w/ Axis dermis, Cystoscopy 6/10/24       Infection: No  Hospital Acquired: No    Surgical Operations & Procedures: CYSTOSCOPY CYSTOCELE REPAIR AXIS DERMIS     Consultations: Anesthesia    Pertinent Findings & Procedures:   Annalisa Nance is a 59 y.o. female , admitted for Surgical management of cystocele; received Ancef 2g.  She underwent CYSTOSCOPY CYSTOCELE REPAIR AXIS DERMIS  on 6/10/24. She was discharged home with a nelson catheter. Post-op course normal, discharged home on POD# 0.  Follow up in 1 week. Discharge instructions reviewed and questions answered.    Course of patient: normal    Discharge to: Home    Readmission planned: No    Recommendations on Discharge:     Medications:     Medication List        START taking these medications      cephALEXin 500 MG capsule  Commonly known as: Keflex  Take 1 capsule by mouth 3 times daily for 7 days Please take for 5 days if discharged home without nelson catheter, take all 7 days if discharged home with nelson catheter.     * ondansetron 4 MG tablet  Commonly known as: ZOFRAN  Take 1 tablet by mouth 3 times daily as needed for Nausea or Vomiting     oxyCODONE-acetaminophen 5-325 MG per tablet  Commonly known as: Percocet  Take 1 tablet by mouth every 6 hours as needed for Pain for up to 5 doses. Intended supply: 7 days. Take lowest dose possible to manage pain Max Daily Amount: 4 tablets     * senna-docusate 8.6-50 MG per tablet  Commonly known as: Senokot S  Take 1 tablet by mouth at bedtime

## 2024-06-10 ENCOUNTER — ANESTHESIA (OUTPATIENT)
Dept: OPERATING ROOM | Age: 59
End: 2024-06-10
Payer: COMMERCIAL

## 2024-06-10 ENCOUNTER — HOSPITAL ENCOUNTER (OUTPATIENT)
Age: 59
Setting detail: OUTPATIENT SURGERY
Discharge: HOME | End: 2024-06-10
Attending: OBSTETRICS & GYNECOLOGY | Admitting: OBSTETRICS & GYNECOLOGY
Payer: COMMERCIAL

## 2024-06-10 VITALS
BODY MASS INDEX: 29.37 KG/M2 | WEIGHT: 172 LBS | SYSTOLIC BLOOD PRESSURE: 119 MMHG | HEART RATE: 66 BPM | TEMPERATURE: 97.3 F | HEIGHT: 64 IN | RESPIRATION RATE: 12 BRPM | OXYGEN SATURATION: 92 % | DIASTOLIC BLOOD PRESSURE: 66 MMHG

## 2024-06-10 DIAGNOSIS — Z98.890 S/P ANTERIOR COLPORRHAPHY: Primary | ICD-10-CM

## 2024-06-10 DIAGNOSIS — N81.10 CYSTOCELE, UNSPECIFIED: ICD-10-CM

## 2024-06-10 PROCEDURE — 2720000010 HC SURG SUPPLY STERILE: Performed by: OBSTETRICS & GYNECOLOGY

## 2024-06-10 PROCEDURE — 3700000001 HC ADD 15 MINUTES (ANESTHESIA): Performed by: OBSTETRICS & GYNECOLOGY

## 2024-06-10 PROCEDURE — 2580000003 HC RX 258: Performed by: STUDENT IN AN ORGANIZED HEALTH CARE EDUCATION/TRAINING PROGRAM

## 2024-06-10 PROCEDURE — 6370000000 HC RX 637 (ALT 250 FOR IP)

## 2024-06-10 PROCEDURE — 7100000011 HC PHASE II RECOVERY - ADDTL 15 MIN: Performed by: OBSTETRICS & GYNECOLOGY

## 2024-06-10 PROCEDURE — 3700000000 HC ANESTHESIA ATTENDED CARE: Performed by: OBSTETRICS & GYNECOLOGY

## 2024-06-10 PROCEDURE — 88302 TISSUE EXAM BY PATHOLOGIST: CPT

## 2024-06-10 PROCEDURE — C1762 CONN TISS, HUMAN(INC FASCIA): HCPCS | Performed by: OBSTETRICS & GYNECOLOGY

## 2024-06-10 PROCEDURE — 6360000002 HC RX W HCPCS: Performed by: NURSE ANESTHETIST, CERTIFIED REGISTERED

## 2024-06-10 PROCEDURE — 7100000000 HC PACU RECOVERY - FIRST 15 MIN: Performed by: OBSTETRICS & GYNECOLOGY

## 2024-06-10 PROCEDURE — 3600000013 HC SURGERY LEVEL 3 ADDTL 15MIN: Performed by: OBSTETRICS & GYNECOLOGY

## 2024-06-10 PROCEDURE — 3600000003 HC SURGERY LEVEL 3 BASE: Performed by: OBSTETRICS & GYNECOLOGY

## 2024-06-10 PROCEDURE — 6360000002 HC RX W HCPCS: Performed by: STUDENT IN AN ORGANIZED HEALTH CARE EDUCATION/TRAINING PROGRAM

## 2024-06-10 PROCEDURE — 2709999900 HC NON-CHARGEABLE SUPPLY: Performed by: OBSTETRICS & GYNECOLOGY

## 2024-06-10 PROCEDURE — 6360000002 HC RX W HCPCS

## 2024-06-10 PROCEDURE — 2500000003 HC RX 250 WO HCPCS: Performed by: NURSE ANESTHETIST, CERTIFIED REGISTERED

## 2024-06-10 PROCEDURE — 7100000001 HC PACU RECOVERY - ADDTL 15 MIN: Performed by: OBSTETRICS & GYNECOLOGY

## 2024-06-10 PROCEDURE — 2500000003 HC RX 250 WO HCPCS

## 2024-06-10 PROCEDURE — 6370000000 HC RX 637 (ALT 250 FOR IP): Performed by: NURSE ANESTHETIST, CERTIFIED REGISTERED

## 2024-06-10 PROCEDURE — 7100000010 HC PHASE II RECOVERY - FIRST 15 MIN: Performed by: OBSTETRICS & GYNECOLOGY

## 2024-06-10 DEVICE — ALLOGRAFT AXIS 6CM X 8CM TUTOPLAST PROCESSED DERMIS: Type: IMPLANTABLE DEVICE | Site: PELVIS | Status: FUNCTIONAL

## 2024-06-10 RX ORDER — SODIUM CHLORIDE 9 MG/ML
INJECTION, SOLUTION INTRAVENOUS PRN
Status: DISCONTINUED | OUTPATIENT
Start: 2024-06-10 | End: 2024-06-10 | Stop reason: HOSPADM

## 2024-06-10 RX ORDER — TAMSULOSIN HYDROCHLORIDE 0.4 MG/1
0.4 CAPSULE ORAL DAILY
Qty: 7 CAPSULE | Refills: 0 | Status: SHIPPED | OUTPATIENT
Start: 2024-06-10 | End: 2024-06-17

## 2024-06-10 RX ORDER — FAMOTIDINE 10 MG/ML
INJECTION, SOLUTION INTRAVENOUS
Status: COMPLETED
Start: 2024-06-10 | End: 2024-06-10

## 2024-06-10 RX ORDER — SODIUM CHLORIDE, SODIUM LACTATE, POTASSIUM CHLORIDE, CALCIUM CHLORIDE 600; 310; 30; 20 MG/100ML; MG/100ML; MG/100ML; MG/100ML
INJECTION, SOLUTION INTRAVENOUS CONTINUOUS
Status: DISCONTINUED | OUTPATIENT
Start: 2024-06-10 | End: 2024-06-10 | Stop reason: HOSPADM

## 2024-06-10 RX ORDER — AMOXICILLIN 250 MG
1 CAPSULE ORAL NIGHTLY
Qty: 30 TABLET | Refills: 0 | Status: SHIPPED | OUTPATIENT
Start: 2024-06-10 | End: 2024-07-10

## 2024-06-10 RX ORDER — ALBUTEROL SULFATE 90 UG/1
AEROSOL, METERED RESPIRATORY (INHALATION) PRN
Status: DISCONTINUED | OUTPATIENT
Start: 2024-06-10 | End: 2024-06-10 | Stop reason: SDUPTHER

## 2024-06-10 RX ORDER — CEFAZOLIN 2 G/1
INJECTION, POWDER, FOR SOLUTION INTRAMUSCULAR; INTRAVENOUS
Status: DISCONTINUED
Start: 2024-06-10 | End: 2024-06-10 | Stop reason: HOSPADM

## 2024-06-10 RX ORDER — DEXAMETHASONE SODIUM PHOSPHATE 10 MG/ML
INJECTION, SOLUTION INTRAMUSCULAR; INTRAVENOUS PRN
Status: DISCONTINUED | OUTPATIENT
Start: 2024-06-10 | End: 2024-06-10 | Stop reason: SDUPTHER

## 2024-06-10 RX ORDER — SEVOFLURANE 250 ML/250ML
LIQUID RESPIRATORY (INHALATION)
Status: DISCONTINUED
Start: 2024-06-10 | End: 2024-06-10 | Stop reason: HOSPADM

## 2024-06-10 RX ORDER — CEPHALEXIN 500 MG/1
500 CAPSULE ORAL 3 TIMES DAILY
Qty: 21 CAPSULE | Refills: 0 | Status: SHIPPED | OUTPATIENT
Start: 2024-06-10 | End: 2024-06-17

## 2024-06-10 RX ORDER — SODIUM CHLORIDE 0.9 % (FLUSH) 0.9 %
5-40 SYRINGE (ML) INJECTION EVERY 12 HOURS SCHEDULED
Status: DISCONTINUED | OUTPATIENT
Start: 2024-06-10 | End: 2024-06-10 | Stop reason: HOSPADM

## 2024-06-10 RX ORDER — MIDAZOLAM HYDROCHLORIDE 1 MG/ML
INJECTION INTRAMUSCULAR; INTRAVENOUS PRN
Status: DISCONTINUED | OUTPATIENT
Start: 2024-06-10 | End: 2024-06-10 | Stop reason: SDUPTHER

## 2024-06-10 RX ORDER — PHENAZOPYRIDINE HYDROCHLORIDE 100 MG/1
100 TABLET, FILM COATED ORAL ONCE
Status: COMPLETED | OUTPATIENT
Start: 2024-06-10 | End: 2024-06-10

## 2024-06-10 RX ORDER — KETOROLAC TROMETHAMINE 30 MG/ML
INJECTION, SOLUTION INTRAMUSCULAR; INTRAVENOUS PRN
Status: DISCONTINUED | OUTPATIENT
Start: 2024-06-10 | End: 2024-06-10 | Stop reason: SDUPTHER

## 2024-06-10 RX ORDER — SODIUM CHLORIDE 0.9 % (FLUSH) 0.9 %
5-40 SYRINGE (ML) INJECTION PRN
Status: DISCONTINUED | OUTPATIENT
Start: 2024-06-10 | End: 2024-06-10 | Stop reason: HOSPADM

## 2024-06-10 RX ORDER — OXYCODONE HYDROCHLORIDE AND ACETAMINOPHEN 5; 325 MG/1; MG/1
1 TABLET ORAL EVERY 6 HOURS PRN
Qty: 5 TABLET | Refills: 0 | Status: SHIPPED | OUTPATIENT
Start: 2024-06-10 | End: 2024-06-14

## 2024-06-10 RX ORDER — NALOXONE HYDROCHLORIDE 0.4 MG/ML
INJECTION, SOLUTION INTRAMUSCULAR; INTRAVENOUS; SUBCUTANEOUS PRN
Status: DISCONTINUED | OUTPATIENT
Start: 2024-06-10 | End: 2024-06-10 | Stop reason: HOSPADM

## 2024-06-10 RX ORDER — PROPOFOL 10 MG/ML
INJECTION, EMULSION INTRAVENOUS PRN
Status: DISCONTINUED | OUTPATIENT
Start: 2024-06-10 | End: 2024-06-10 | Stop reason: SDUPTHER

## 2024-06-10 RX ORDER — ONDANSETRON 2 MG/ML
INJECTION INTRAMUSCULAR; INTRAVENOUS PRN
Status: DISCONTINUED | OUTPATIENT
Start: 2024-06-10 | End: 2024-06-10 | Stop reason: SDUPTHER

## 2024-06-10 RX ORDER — LIDOCAINE HYDROCHLORIDE 10 MG/ML
INJECTION, SOLUTION INFILTRATION; PERINEURAL PRN
Status: DISCONTINUED | OUTPATIENT
Start: 2024-06-10 | End: 2024-06-10 | Stop reason: SDUPTHER

## 2024-06-10 RX ORDER — HYDRALAZINE HYDROCHLORIDE 20 MG/ML
10 INJECTION INTRAMUSCULAR; INTRAVENOUS
Status: DISCONTINUED | OUTPATIENT
Start: 2024-06-10 | End: 2024-06-10 | Stop reason: HOSPADM

## 2024-06-10 RX ORDER — ONDANSETRON 4 MG/1
4 TABLET, FILM COATED ORAL 3 TIMES DAILY PRN
Qty: 30 TABLET | Refills: 0 | Status: SHIPPED | OUTPATIENT
Start: 2024-06-10

## 2024-06-10 RX ORDER — FENTANYL CITRATE 50 UG/ML
INJECTION, SOLUTION INTRAMUSCULAR; INTRAVENOUS PRN
Status: DISCONTINUED | OUTPATIENT
Start: 2024-06-10 | End: 2024-06-10 | Stop reason: SDUPTHER

## 2024-06-10 RX ORDER — LIDOCAINE HYDROCHLORIDE 10 MG/ML
1 INJECTION, SOLUTION EPIDURAL; INFILTRATION; INTRACAUDAL; PERINEURAL
Status: DISCONTINUED | OUTPATIENT
Start: 2024-06-10 | End: 2024-06-10 | Stop reason: HOSPADM

## 2024-06-10 RX ORDER — PROCHLORPERAZINE EDISYLATE 5 MG/ML
5 INJECTION INTRAMUSCULAR; INTRAVENOUS
Status: DISCONTINUED | OUTPATIENT
Start: 2024-06-10 | End: 2024-06-10 | Stop reason: HOSPADM

## 2024-06-10 RX ORDER — IBUPROFEN 600 MG/1
600 TABLET ORAL 3 TIMES DAILY
Qty: 30 TABLET | Refills: 0 | Status: SHIPPED | OUTPATIENT
Start: 2024-06-10 | End: 2024-06-20

## 2024-06-10 RX ORDER — LABETALOL HYDROCHLORIDE 5 MG/ML
10 INJECTION, SOLUTION INTRAVENOUS
Status: DISCONTINUED | OUTPATIENT
Start: 2024-06-10 | End: 2024-06-10 | Stop reason: HOSPADM

## 2024-06-10 RX ORDER — PHENAZOPYRIDINE HYDROCHLORIDE 100 MG/1
TABLET, FILM COATED ORAL
Status: COMPLETED
Start: 2024-06-10 | End: 2024-06-10

## 2024-06-10 RX ADMIN — KETOROLAC TROMETHAMINE 30 MG: 30 INJECTION, SOLUTION INTRAMUSCULAR; INTRAVENOUS at 08:30

## 2024-06-10 RX ADMIN — PHENAZOPYRIDINE HYDROCHLORIDE 100 MG: 100 TABLET, FILM COATED ORAL at 06:30

## 2024-06-10 RX ADMIN — ALBUTEROL SULFATE 2 PUFF: 90 AEROSOL, METERED RESPIRATORY (INHALATION) at 08:11

## 2024-06-10 RX ADMIN — HYDROMORPHONE HYDROCHLORIDE 0.3 MG: 1 INJECTION, SOLUTION INTRAMUSCULAR; INTRAVENOUS; SUBCUTANEOUS at 09:13

## 2024-06-10 RX ADMIN — CEFAZOLIN 2000 MG: 2 INJECTION, POWDER, FOR SOLUTION INTRAMUSCULAR; INTRAVENOUS at 07:37

## 2024-06-10 RX ADMIN — ONDANSETRON 4 MG: 2 INJECTION INTRAMUSCULAR; INTRAVENOUS at 07:37

## 2024-06-10 RX ADMIN — FENTANYL CITRATE 50 MCG: 50 INJECTION, SOLUTION INTRAMUSCULAR; INTRAVENOUS at 07:31

## 2024-06-10 RX ADMIN — DEXAMETHASONE SODIUM PHOSPHATE 10 MG: 10 INJECTION, SOLUTION INTRAMUSCULAR; INTRAVENOUS at 07:37

## 2024-06-10 RX ADMIN — PROPOFOL 50 MG: 10 INJECTION, EMULSION INTRAVENOUS at 08:05

## 2024-06-10 RX ADMIN — MIDAZOLAM 2 MG: 1 INJECTION INTRAMUSCULAR; INTRAVENOUS at 07:28

## 2024-06-10 RX ADMIN — Medication 0.3 MG: at 09:13

## 2024-06-10 RX ADMIN — SODIUM CHLORIDE, POTASSIUM CHLORIDE, SODIUM LACTATE AND CALCIUM CHLORIDE: 600; 310; 30; 20 INJECTION, SOLUTION INTRAVENOUS at 08:17

## 2024-06-10 RX ADMIN — PHENAZOPYRIDINE HYDROCHLORIDE 100 MG: 100 TABLET ORAL at 06:30

## 2024-06-10 RX ADMIN — SODIUM CHLORIDE, POTASSIUM CHLORIDE, SODIUM LACTATE AND CALCIUM CHLORIDE: 600; 310; 30; 20 INJECTION, SOLUTION INTRAVENOUS at 06:24

## 2024-06-10 RX ADMIN — SODIUM CHLORIDE, PRESERVATIVE FREE 10 ML: 5 INJECTION INTRAVENOUS at 07:08

## 2024-06-10 RX ADMIN — LIDOCAINE HYDROCHLORIDE 40 MG: 10 INJECTION, SOLUTION INFILTRATION; PERINEURAL at 07:31

## 2024-06-10 RX ADMIN — PROPOFOL 200 MG: 10 INJECTION, EMULSION INTRAVENOUS at 07:31

## 2024-06-10 RX ADMIN — FAMOTIDINE 20 MG: 10 INJECTION, SOLUTION INTRAVENOUS at 07:08

## 2024-06-10 RX ADMIN — FENTANYL CITRATE 50 MCG: 50 INJECTION, SOLUTION INTRAMUSCULAR; INTRAVENOUS at 08:00

## 2024-06-10 ASSESSMENT — PAIN SCALES - GENERAL
PAINLEVEL_OUTOF10: 3
PAINLEVEL_OUTOF10: 5
PAINLEVEL_OUTOF10: 2

## 2024-06-10 ASSESSMENT — PAIN SCALES - WONG BAKER
WONGBAKER_NUMERICALRESPONSE: HURTS EVEN MORE
WONGBAKER_NUMERICALRESPONSE: HURTS A LITTLE BIT

## 2024-06-10 ASSESSMENT — PAIN DESCRIPTION - PAIN TYPE: TYPE: SURGICAL PAIN

## 2024-06-10 ASSESSMENT — PAIN - FUNCTIONAL ASSESSMENT: PAIN_FUNCTIONAL_ASSESSMENT: 0-10

## 2024-06-10 ASSESSMENT — PAIN DESCRIPTION - DESCRIPTORS: DESCRIPTORS: BURNING;PRESSURE

## 2024-06-10 ASSESSMENT — PAIN DESCRIPTION - LOCATION: LOCATION: GROIN

## 2024-06-10 NOTE — OP NOTE
lap sponge.  Sponge and needle counts were correct at this time.  The Spivey's sutures were then cinched in successive fashion tissue to elevate the vaginal apex into the sacral hollow, plicate uterosacral ligaments with multiple sutured high points of support and obliterate any enterocele sac.  The vaginal cuff was then closed in a horizontal manner with locked running 1-0 Vicryl suture placed from angle to angle, incorporating the uterosacral ligaments into the cuff closure for added support.     Large enterocele: No.    Returning to the anterior repair, the NetSpend needle  was then used to deploy one permanent suture into each sacrospinous ligament bilaterally, staying 2.5 cm medial and 1 cm inferior to the ischial spines.  Each placement was checked with manual palpation for a precise placement. One more permanent suture was placed with the needle  into each Rafal's ligaments bilaterally and distally.  All 4 sutures were tugged to ensure a strong ligamentous purchase. No hemorrhage was encountered during suture placement.   A sheet of saline reconstituted biologic graft material was then sized appropriately into a trapezoid that would cover the entire bladder base.      Before placing the biologic graft, midline reduction of the cystocele was undertaken. If the bladder was extremely redundant, it was purse stringed in the middle with 2-0 Vicryl suture to reduce the central bulk of the cystocele which lessened risk of ureteral involvement. Otherwise, a two layer closure of interrupted 2-0 Vicryl suture helped to plicate medial and lateral paravesical fascia and reestablish the apical pubovaginal ring of support.  Any distal urethrocele was addressed by extending the distal incision underneath the urethra and providing a midline plication of periurethral tissue.  The piece of biologic graft was then perforated with the needles of the afore placed permanent sutures and then the graft was manually

## 2024-06-10 NOTE — BRIEF OP NOTE
Brief Operative Note  Department of Obstetrics and Gynecology  [facility]     Patient: Annalisa Nance   : 1965  MRN: [id number]       Acct: [id number]   Date of Procedure: 6/10/24     Pre-operative Diagnosis: 59 y.o. female    Cystocele  Uterine Prolapse  Stress Urinary Incontinence   S/p RALG, BSO, A/P repair with Bulkamid 23    Post-operative Diagnosis:   Cystocele  Uterine Prolapse  Stress Urinary Incontinence   S/p RALG, BSO, A/P repair with Bulkamid 23    Procedure: Anterior Colporrhaphy with [place] Dermis, Cystoscopy    Surgeon: Dr. Hunter     Assistant(s): Kat Zuniga DO, PGY3; Eliezer Krishnan, PGY2    Anesthesia: general    Findings:  Normal appearing external genitalia and hair distribution, Cystocele and anterior enterocele present, previous vaginal scar present, bilateral ureteral jets present  Total IV fluids/Blood products:  1100 ml crystalloid  Urine Output:  50 ml    Estimated blood loss:  30ml  Drains:  nelson catheter  Specimens:  vaginal mucosa   Instrument and Sponge Count: Correct  Complications:  none  Condition:  stable, transferred to post anesthesia recovery    See full operative report for further details.    Kat Zuniga DO  Ob/Gyn Resident  6/10/2024, 8:48 AM

## 2024-06-10 NOTE — ANESTHESIA PRE PROCEDURE
Department of Anesthesiology  Preprocedure Note       Name:  Annalisa Nance   Age:  59 y.o.  :  1965                                          MRN:  [id number]         Date:  6/10/2024      Surgeon: Surgeon(s):  Armando Hunter DO    Procedure: Procedure(s):  CYSTOSCOPY CYSTOCELE REPAIR AXIS DERMIS CAPIO NEEDLE  AND #4 ETHIBOND    Medications prior to admission:   Prior to Admission medications    Medication Sig Start Date End Date Taking? Authorizing Provider   ondansetron (ZOFRAN) 4 MG tablet Take 1 tablet by mouth 3 times daily as needed for Nausea or Vomiting 6/10/24  Yes Kat Zuniga DO   ibuprofen (ADVIL;MOTRIN) 600 MG tablet Take 1 tablet by mouth in the morning, at noon, and at bedtime for 30 doses 6/10/24 6/20/24 Yes Kat Zuniga DO   senna-docusate (SENOKOT S) 8.6-50 MG per tablet Take 1 tablet by mouth at bedtime 6/10/24 7/10/24 Yes Kat Zuniga DO   oxyCODONE-acetaminophen (PERCOCET) 5-325 MG per tablet Take 1 tablet by mouth every 6 hours as needed for Pain for up to 5 doses. Intended supply: 7 days. Take lowest dose possible to manage pain Max Daily Amount: 4 tablets 6/10/24 6/14/24 Yes Kat Zuniga DO   cephALEXin (KEFLEX) 500 MG capsule Take 1 capsule by mouth 3 times daily for 7 days Please take for 5 days if discharged home without nelson catheter, take all 7 days if discharged home with nelson catheter. 6/10/24 6/17/24 Yes Kat Zuniga DO   loratadine (CLARITIN) 10 MG capsule Take 1 capsule by mouth daily as needed   Yes Provider, MD Reny   ibuprofen (ADVIL;MOTRIN) 600 MG tablet Take 1 tablet by mouth every 6 hours as needed for Pain 23  Joycelyn Garcia DO   sennosides-docusate sodium (SENOKOT-S) 8.6-50 MG tablet Take 1 tablet by mouth in the morning and at bedtime  Patient not taking: Reported on 6/10/2024 7/31/23   Joycelyn Garcia DO   simethicone (MYLICON) 80 MG chewable tablet Take 1 tablet by mouth 4 times daily as needed for

## 2024-06-12 PROBLEM — I25.5 GENERALIZED ISCHEMIC MYOCARDIAL DYSFUNCTION: Status: ACTIVE | Noted: 2024-06-12

## 2024-06-12 PROBLEM — K57.30 SIGMOID DIVERTICULOSIS: Status: ACTIVE | Noted: 2024-06-12

## 2024-06-12 PROBLEM — E78.5 HYPERLIPIDEMIA: Status: ACTIVE | Noted: 2024-03-13

## 2024-06-12 PROBLEM — I21.4 NON-ST ELEVATED MYOCARDIAL INFARCTION (NON-STEMI) (HCC): Status: ACTIVE | Noted: 2024-06-12

## 2024-06-12 PROBLEM — I51.81 TAKOTSUBO SYNDROME: Status: ACTIVE | Noted: 2024-03-13

## 2024-06-12 PROBLEM — K44.9 HIATAL HERNIA WITH GERD WITHOUT ESOPHAGITIS: Status: ACTIVE | Noted: 2024-06-12

## 2024-06-12 PROBLEM — N87.9 CERVICAL DYSPLASIA: Status: ACTIVE | Noted: 2024-06-12

## 2024-06-12 PROBLEM — D35.00 ADRENAL ADENOMA: Status: ACTIVE | Noted: 2024-06-12

## 2024-06-12 PROBLEM — R73.03 PREDIABETES: Status: ACTIVE | Noted: 2024-05-22

## 2024-06-12 PROBLEM — R39.14 FEELING OF INCOMPLETE BLADDER EMPTYING: Status: ACTIVE | Noted: 2024-06-12

## 2024-06-12 PROBLEM — Z01.810 PREOP CARDIOVASCULAR EXAM: Status: ACTIVE | Noted: 2024-05-30

## 2024-06-12 PROBLEM — M50.90 CERVICAL DISC DISEASE: Status: ACTIVE | Noted: 2024-06-12

## 2024-06-12 PROBLEM — N81.6 RECTOCELE: Status: ACTIVE | Noted: 2024-06-12

## 2024-06-12 PROBLEM — J30.2 SEASONAL ALLERGIES: Status: ACTIVE | Noted: 2024-06-12

## 2024-06-12 PROBLEM — F41.9 ANXIETY: Status: ACTIVE | Noted: 2024-06-12

## 2024-06-12 PROBLEM — K21.9 GERD (GASTROESOPHAGEAL REFLUX DISEASE): Status: ACTIVE | Noted: 2024-06-12

## 2024-06-12 PROBLEM — K59.00 CONSTIPATION: Status: ACTIVE | Noted: 2024-06-12

## 2024-06-12 PROBLEM — I25.2 HX OF MYOCARDIAL INFARCTION: Status: ACTIVE | Noted: 2024-06-12

## 2024-06-12 PROBLEM — R31.29 MICROSCOPIC HEMATURIA: Status: ACTIVE | Noted: 2024-06-12

## 2024-06-12 PROBLEM — R10.13 EPIGASTRIC PAIN: Status: ACTIVE | Noted: 2024-06-12

## 2024-06-12 PROBLEM — R39.12 WEAK URINARY STREAM: Status: ACTIVE | Noted: 2024-06-12

## 2024-06-12 PROBLEM — R35.0 URINE FREQUENCY: Status: ACTIVE | Noted: 2024-06-12

## 2024-06-12 PROBLEM — F32.A DEPRESSION: Status: ACTIVE | Noted: 2024-06-12

## 2024-06-12 PROBLEM — B97.7 HPV (HUMAN PAPILLOMA VIRUS) INFECTION: Status: ACTIVE | Noted: 2024-06-12

## 2024-06-12 PROBLEM — R30.0 DYSURIA: Status: ACTIVE | Noted: 2024-06-12

## 2024-06-12 LAB — SURGICAL PATHOLOGY REPORT: NORMAL

## 2024-06-12 RX ORDER — FLUTICASONE PROPIONATE 50 MCG
2 SPRAY, SUSPENSION (ML) NASAL DAILY
COMMUNITY
Start: 2017-12-01

## 2024-06-12 RX ORDER — NITROGLYCERIN 0.4 MG/1
0.4 TABLET SUBLINGUAL EVERY 5 MIN PRN
COMMUNITY
Start: 2017-12-04

## 2024-06-12 RX ORDER — LORATADINE AND PSEUDOEPHEDRINE SULFATE 5; 120 MG/1; MG/1
1 TABLET, EXTENDED RELEASE ORAL 2 TIMES DAILY PRN
COMMUNITY
Start: 2024-05-22

## 2024-06-12 RX ORDER — CETIRIZINE HYDROCHLORIDE, PSEUDOEPHEDRINE HYDROCHLORIDE 5; 120 MG/1; MG/1
1 TABLET, FILM COATED, EXTENDED RELEASE ORAL 2 TIMES DAILY
COMMUNITY
Start: 2017-12-01

## 2024-06-12 RX ORDER — POLYETHYLENE GLYCOL 3350 17 G/17G
17 POWDER, FOR SOLUTION ORAL
Status: CANCELLED | OUTPATIENT
Start: 2024-06-12 | End: 2024-07-12

## 2024-06-12 RX ORDER — CEPHALEXIN 500 MG/1
500 CAPSULE ORAL 3 TIMES DAILY
Qty: 21 CAPSULE | Refills: 0 | Status: CANCELLED | OUTPATIENT
Start: 2024-06-12 | End: 2024-06-19

## 2024-06-12 RX ORDER — LORATADINE AND PSEUDOEPHEDRINE SULFATE 5; 120 MG/1; MG/1
1 TABLET, EXTENDED RELEASE ORAL 2 TIMES DAILY PRN
Status: CANCELLED | OUTPATIENT
Start: 2024-06-12

## 2024-06-12 RX ORDER — PANTOPRAZOLE SODIUM 40 MG/1
40 TABLET, DELAYED RELEASE ORAL DAILY
COMMUNITY
Start: 2024-04-26

## 2024-06-12 RX ORDER — EZETIMIBE 10 MG/1
10 TABLET ORAL
Qty: 30 TABLET | Status: CANCELLED | OUTPATIENT
Start: 2024-06-12

## 2024-06-12 RX ORDER — LANSOPRAZOLE 30 MG/1
30 CAPSULE, DELAYED RELEASE ORAL DAILY
Qty: 30 CAPSULE | Status: CANCELLED | OUTPATIENT
Start: 2024-06-12

## 2024-06-12 RX ORDER — ASPIRIN 81 MG/1
81 TABLET ORAL DAILY
Qty: 30 TABLET | Status: CANCELLED | OUTPATIENT
Start: 2024-06-12

## 2024-06-12 RX ORDER — FLUTICASONE PROPIONATE 50 MCG
2 SPRAY, SUSPENSION (ML) NASAL DAILY
Qty: 16 G | Status: CANCELLED | OUTPATIENT
Start: 2024-06-12

## 2024-06-12 RX ORDER — IBUPROFEN 600 MG/1
600 TABLET ORAL 3 TIMES DAILY
Qty: 30 TABLET | Refills: 0 | Status: CANCELLED | OUTPATIENT
Start: 2024-06-12 | End: 2024-06-22

## 2024-06-12 RX ORDER — ATORVASTATIN CALCIUM 20 MG/1
20 TABLET, FILM COATED ORAL DAILY
COMMUNITY
Start: 2017-12-04

## 2024-06-14 ENCOUNTER — TELEPHONE (OUTPATIENT)
Age: 59
End: 2024-06-14

## 2024-06-14 DIAGNOSIS — Z98.890 POST-OPERATIVE STATE: Primary | ICD-10-CM

## 2024-06-14 NOTE — TELEPHONE ENCOUNTER
POST OP CALL RECORD      Date:  2024   Time:  10:52 AM   Patient:  Annalisa Nance   :  1965   Procedure: Cysto, Cystocele repair Axis Dermis Capio Needle  and #4 Ethibond  Procedure Date: 6/10/2024     How are you feeling?  Feeling good today   Are you voiding ok?  Yes  Is your Catheter draining?   No  Passing Gas?  Yes  Last BM?  2024  If no BM, what have you done to encourage?  Stool Softeners  Any Nausea/Vomiting/Diarrhea?  No    Diet: Good   Fever:  No  Pain: No, 2  What pain medication are you taking? Ibuprofen 600 mg  Post-Op visit Scheduled for follow up? 2024     Comments?    Patient is doing well. Patient did have to go home with a catheter but pulled it on 2024 and is voiding >200ml everytime she is using the restroom. Patient pulled her catheter at 7:30am. Patient is still taking Flomax but denies any trouble with her urine stream. Patient would like to continue Flomax until completed. Patient has had a bowel movement since surgery but is still using the Senna. Patient is controlling her pain with Ibuprofen 600 mg. Informed patient of post op appt date and time and how to reach the on call DrChioma Patient v/u      Noted. [facility]     Diagnosis Orders   1. Post-operative state

## 2024-06-15 ENCOUNTER — HOSPITAL ENCOUNTER (EMERGENCY)
Age: 59
Discharge: HOME | End: 2024-06-15
Payer: COMMERCIAL

## 2024-06-15 VITALS
DIASTOLIC BLOOD PRESSURE: 73 MMHG | TEMPERATURE: 97.88 F | OXYGEN SATURATION: 97 % | HEART RATE: 79 BPM | SYSTOLIC BLOOD PRESSURE: 134 MMHG

## 2024-06-15 VITALS
TEMPERATURE: 98.2 F | SYSTOLIC BLOOD PRESSURE: 137 MMHG | HEART RATE: 81 BPM | DIASTOLIC BLOOD PRESSURE: 60 MMHG | OXYGEN SATURATION: 97 %

## 2024-06-15 VITALS — OXYGEN SATURATION: 95 % | HEART RATE: 71 BPM

## 2024-06-15 VITALS — HEART RATE: 78 BPM | OXYGEN SATURATION: 95 % | DIASTOLIC BLOOD PRESSURE: 60 MMHG | SYSTOLIC BLOOD PRESSURE: 122 MMHG

## 2024-06-15 VITALS — OXYGEN SATURATION: 96 % | SYSTOLIC BLOOD PRESSURE: 123 MMHG | HEART RATE: 72 BPM | DIASTOLIC BLOOD PRESSURE: 69 MMHG

## 2024-06-15 VITALS — DIASTOLIC BLOOD PRESSURE: 60 MMHG | SYSTOLIC BLOOD PRESSURE: 137 MMHG

## 2024-06-15 VITALS — OXYGEN SATURATION: 99 %

## 2024-06-15 VITALS — OXYGEN SATURATION: 96 % | HEART RATE: 74 BPM

## 2024-06-15 VITALS — HEART RATE: 79 BPM | OXYGEN SATURATION: 98 %

## 2024-06-15 VITALS — HEART RATE: 69 BPM | OXYGEN SATURATION: 97 %

## 2024-06-15 VITALS — BODY MASS INDEX: 29.2 KG/M2

## 2024-06-15 DIAGNOSIS — R06.02: ICD-10-CM

## 2024-06-15 DIAGNOSIS — R00.2: Primary | ICD-10-CM

## 2024-06-15 DIAGNOSIS — I25.2: ICD-10-CM

## 2024-06-15 LAB
ADD MANUAL DIFF: NO
ALANINE AMINOTRANSFERASE: 20 U/L (ref 14–59)
ALBUMIN GLOBULIN RATIO: 1.2
ALBUMIN LEVEL: 3.6 G/DL (ref 3.4–5)
ALKALINE PHOSPHATASE: 76 U/L (ref 46–116)
ANION GAP: 11.1
ASPARTATE AMINO TRANSFERASE: 11 U/L (ref 15–37)
BLOOD UREA NITROGEN: 17 MG/DL (ref 7–18)
CALCIUM: 8.6 MG/DL (ref 8.5–10.1)
CARBON DIOXIDE: 25.4 MMOL/L (ref 21–32)
CHLORIDE: 106 MMOL/L (ref 98–107)
ESTIMATED GFR (AFRICAN AMERICA: >60 (ref 60–?)
ESTIMATED GFR (NON-AFRICAN AME: >60 (ref 60–?)
GLOBULIN: 3.1 G/DL
GLUCOSE: 179 MG/DL (ref 74–106)
HEMATOCRIT: 33.8 % (ref 36–48)
HEMOGLOBIN: 11.2 G/DL (ref 12–16)
IMMATURE GRANULOCYTES ABS AUTO: 0.02 10^3/UL (ref 0–0.03)
IMMATURE GRANULOCYTES PCT AUTO: 0.2 % (ref 0–0.5)
INR: 1.03
LACTATE/LACTIC ACID: 1.3 MMOL/L (ref 0.4–2)
LYMPHOCYTES  ABSOLUTE AUTO: 2.1 10^3/UL (ref 1.2–3.8)
MAGNESIUM: 2.1 MG/DL (ref 1.8–2.4)
MCV RBC: 85.4 FL (ref 81–99)
MEAN CORPUSCULAR HEMOGLOBIN: 28.3 PG (ref 26.7–34)
MEAN CORPUSCULAR HGB CONC: 33.1 G/DL (ref 29.9–35.2)
NT PRO B TYPE NATRIURETIC PEPT: 60 PG/ML (ref ?–900)
PLATELET COUNT: 307 10^3/UL (ref 150–450)
POTASSIUM: 3.5 MMOL/L (ref 3.5–5.1)
PROTHROMBIN TIME: 10.9 SEC (ref 9–11.6)
RED BLOOD COUNT: 3.96 10^6/UL (ref 4.2–5.4)
SODIUM: 139 MMOL/L (ref 136–145)
THYROID STIMULATING HORMONE: 1.07 UIU/ML (ref 0.36–3.74)
TOTAL PROTEIN: 6.7 G/DL (ref 6.4–8.2)
WHITE BLOOD COUNT: 9.5 10^3/UL (ref 4–11)

## 2024-06-15 PROCEDURE — 80053 COMPREHEN METABOLIC PANEL: CPT

## 2024-06-15 PROCEDURE — 99285 EMERGENCY DEPT VISIT HI MDM: CPT

## 2024-06-15 PROCEDURE — 71045 X-RAY EXAM CHEST 1 VIEW: CPT

## 2024-06-15 PROCEDURE — 84443 ASSAY THYROID STIM HORMONE: CPT

## 2024-06-15 PROCEDURE — 85610 PROTHROMBIN TIME: CPT

## 2024-06-15 PROCEDURE — 85378 FIBRIN DEGRADE SEMIQUANT: CPT

## 2024-06-15 PROCEDURE — 85025 COMPLETE CBC W/AUTO DIFF WBC: CPT

## 2024-06-15 PROCEDURE — 83735 ASSAY OF MAGNESIUM: CPT

## 2024-06-15 PROCEDURE — 83605 ASSAY OF LACTIC ACID: CPT

## 2024-06-15 PROCEDURE — 36415 COLL VENOUS BLD VENIPUNCTURE: CPT

## 2024-06-15 PROCEDURE — 83880 ASSAY OF NATRIURETIC PEPTIDE: CPT

## 2024-06-15 PROCEDURE — 84484 ASSAY OF TROPONIN QUANT: CPT

## 2024-06-15 PROCEDURE — 93005 ELECTROCARDIOGRAM TRACING: CPT

## 2024-06-16 NOTE — PROGRESS NOTES
[facility], [facility] UROGYNECOLOGY AND PELVIC REHABILITATION   [address] [place] [address]  Dept: [phone]   Patient:  Annalisa Davis   :  1965   Visit Date:  2024     CC: 1-2 week postoperative exam. had concerns including Post-Op Check (1 week p/o appt/CYSTOSCOPY CYSTOCELE REPAIR AXIS DERMIS CAPIO NEEDLE  AND #4 ETHIBOND, VAGINAL ENTEROCELE REPAIR 06/10/2024/Recheck /76///////Patient ended up in the ER on Saturday for dehydration /).      Chaperone present through entire visit: None required    HPI: Patient is here for her 1st week post op visit. She is feeling well today. States that she went to the ER over the weekend, stating that she felt she had an abnormal heart rhythm. She was given fluids and states that she feels much better. She denies any chest pain, SOB or headache. Eating and drinking well today. Urinating and BM OK. No vaginal bleeding; reports light spotting. Pain 3/10, intermittent. She is happy with her repair; feels well.     Status post: Cystoscopy Cystocele Repair Axis Dermis Capio Needle  And #4 Ethibond, Vaginal Enterocele Repair   Date: 6/10/2024     SIGNIFICANT FINDINGS:   Surgical Pathology Report   Date Value Ref Range Status   06/10/2024       Path Number: [id number]    -- Diagnosis --    VAGINAL MUCOSA:  -SQUAMOUS MUCOSA WITH NO PATHOLOGIC DIAGNOSIS      Filiberto Pyle D.O.  **Electronically Signed Out**         [facility]/2024     Clinical Information  Pre-Op Diagnosis: CYSTOCELE, UNSPECIFIED  Operative Findings: VAGINAL MUCOSA  Operation Performed: CYSTOSCOPY CYSTOCELE REPAIR AXIS DERMIS CAPIO  NEEDLE  AND #4 ETHIBOND  dw         Source of Specimen  A: VAGINAL MUCOSA      Gross Description  \"ANNALISA DAVIS VAGINAL MUCOSA\" Received in formalin are two  fragments tan, rubbery vaginal mucosa, 3.7 cm and 4.5 cm.  Sectioning  reveals tan, rubbery cut surfaces with no

## 2024-06-17 ENCOUNTER — OFFICE VISIT (OUTPATIENT)
Age: 59
End: 2024-06-17
Payer: COMMERCIAL

## 2024-06-17 VITALS
SYSTOLIC BLOOD PRESSURE: 138 MMHG | DIASTOLIC BLOOD PRESSURE: 76 MMHG | HEART RATE: 65 BPM | OXYGEN SATURATION: 97 % | TEMPERATURE: 98 F

## 2024-06-17 DIAGNOSIS — Z09 POSTOPERATIVE EXAMINATION: Primary | ICD-10-CM

## 2024-06-17 DIAGNOSIS — R33.9 RETENTION OF URINE: ICD-10-CM

## 2024-06-17 LAB
BILIRUBIN, POC: NORMAL
BLOOD URINE, POC: NORMAL
CLARITY, POC: CLEAR
COLOR, POC: CLEAR
GLUCOSE URINE, POC: NORMAL
KETONES, POC: NORMAL
LEUKOCYTE EST, POC: NORMAL
NITRITE, POC: NORMAL
PH, POC: 6.5
PROTEIN, POC: NORMAL
SPECIFIC GRAVITY, POC: 1.01
UROBILINOGEN, POC: NORMAL

## 2024-06-17 PROCEDURE — 51798 US URINE CAPACITY MEASURE: CPT

## 2024-06-17 PROCEDURE — 99024 POSTOP FOLLOW-UP VISIT: CPT

## 2024-06-17 PROCEDURE — 81003 URINALYSIS AUTO W/O SCOPE: CPT

## 2024-06-17 ASSESSMENT — ENCOUNTER SYMPTOMS
COUGH: 0
SHORTNESS OF BREATH: 0
CONSTIPATION: 0
TROUBLE SWALLOWING: 0
GASTROINTESTINAL NEGATIVE: 1
VOICE CHANGE: 0
DIARRHEA: 0
ABDOMINAL PAIN: 0
NAUSEA: 0
CHEST TIGHTNESS: 0
VOMITING: 0
BLOOD IN STOOL: 0
FACIAL SWELLING: 0
RECTAL PAIN: 0

## 2024-07-12 PROBLEM — Z01.810 PREOP CARDIOVASCULAR EXAM: Status: RESOLVED | Noted: 2024-05-30 | Resolved: 2024-07-12

## 2024-07-15 NOTE — PROGRESS NOTES
[facility], [facility] UROGYNECOLOGY AND PELVIC REHABILITATION   [address] [place] [address]  Dept: [phone]   Patient:  Annalisa Nance    :  1965   Visit Date:  2024     VISIT - FINAL OUTPATIENT POST OP EXAM  CC: The patient presents for a final outpatient postoperative exam. had concerns including Post-Op Check (CYSTOSCOPY CYSTOCELE REPAIR AXIS DERMIS CAPIO NEEDLE  AND #4 ETHIBOND, VAGINAL ENTEROCELE REPAIR-completed on 06/10/2024).    Chaperone present through entire visit: Resident    Will send today's note to PCP / referral provider.    HPI: Patient doing well. No bowel or bladder complaints.    Status post: Cystoscopy Cystocele Repair Axis Dermis Capio Needle  And #4 Ethibond, Vaginal Enterocele Repair   Date: 6/10/2024   Pathology benign. Significant findings: None  Satisfaction: very satisfied    Vitals and signs or symptoms of UTI or infection were assessed. The presence of fevers, nausea, vomiting, chest pain, shortness of breath, or calf pain were evaluated.  Postoperative pain was evaluated.  Bowel and bladder habits were reviewed.  Any remaining vaginal discharge and/or bleeding was confirmed. Medications and compliance were reviewed.    ROS:  Review of Systems   All other systems reviewed and are negative.      PHYSICAL EXAM:  /78 (Site: Left Upper Arm, Position: Sitting, Cuff Size: Medium Adult)   Pulse 72   LMP 2012   SpO2 99%     OBGyn Exam     A direct exam or the postoperative site/s:   Unremarkable with normal abdominopelvic findings, All incisions pertinent to the procedure are healing well and are dry and intact without signs of infection, herniation, or mass, No CVA tenderness, No lymphadenopathy, External vulvovaginal exam reveals no bleeding, abnormal swelling or symmetry, infection, or discharge, Internal vaginal/rectal speculum/digital exam shows excellent healing with no

## 2024-07-16 ENCOUNTER — OFFICE VISIT (OUTPATIENT)
Age: 59
End: 2024-07-16

## 2024-07-16 VITALS — HEART RATE: 72 BPM | DIASTOLIC BLOOD PRESSURE: 78 MMHG | OXYGEN SATURATION: 99 % | SYSTOLIC BLOOD PRESSURE: 132 MMHG

## 2024-07-16 DIAGNOSIS — Z98.890 POST-OPERATIVE STATE: Primary | ICD-10-CM

## 2024-07-16 DIAGNOSIS — N95.2 VAGINAL ATROPHY: ICD-10-CM

## 2024-07-16 PROCEDURE — 99024 POSTOP FOLLOW-UP VISIT: CPT | Performed by: OBSTETRICS & GYNECOLOGY

## 2024-07-16 RX ORDER — SULFAMETHOXAZOLE AND TRIMETHOPRIM 800; 160 MG/1; MG/1
1 TABLET ORAL 2 TIMES DAILY
COMMUNITY
Start: 2024-04-27

## 2024-07-16 RX ORDER — ESTRADIOL 0.1 MG/G
1 CREAM VAGINAL DAILY
Qty: 1 EACH | Refills: 2 | Status: SHIPPED | OUTPATIENT
Start: 2024-07-16

## 2024-08-14 ENCOUNTER — HOSPITAL ENCOUNTER
Dept: HOSPITAL 101 - SLEEP | Age: 59
Discharge: HOME | End: 2024-08-14
Payer: COMMERCIAL

## 2024-08-14 DIAGNOSIS — G47.33: Primary | ICD-10-CM

## 2024-08-14 PROCEDURE — 95811 POLYSOM 6/>YRS CPAP 4/> PARM: CPT

## 2024-12-09 NOTE — PROGRESS NOTES
rotation of the bladder neck TVL ample at 8.5 cm    ASSESSMENT/PLAN:  No results found for any visits on 07/16/24.      Post-operative state           The patient was counseled regarding review of all conditions discussed. Continue home PFT but will make in person PFT to augment for UUI. Discussed VET. Twice weekly. Pap next yr.         EMR / Voice Dictation Disclaimer: - This note is created with the assistance of a speech recognition program.  While intending to generate a timely document that accurately reflects the content of the visit, there is no guarantee every grammatical, syntax, or spelling error has been or will be identified or corrected.  Additionally, system limitations of the EMR and voice recognition software beyond the control of the practitioner may cause unintentional errors or omissions not identified or corrected at the time of record finalization and signature.

## 2024-12-10 ENCOUNTER — OFFICE VISIT (OUTPATIENT)
Age: 59
End: 2024-12-10
Payer: COMMERCIAL

## 2024-12-10 VITALS — DIASTOLIC BLOOD PRESSURE: 70 MMHG | OXYGEN SATURATION: 97 % | SYSTOLIC BLOOD PRESSURE: 131 MMHG | HEART RATE: 74 BPM

## 2024-12-10 DIAGNOSIS — K59.09 OTHER CONSTIPATION: ICD-10-CM

## 2024-12-10 DIAGNOSIS — N39.41 URGE INCONTINENCE: Primary | ICD-10-CM

## 2024-12-10 DIAGNOSIS — R32 URINARY INCONTINENCE, UNSPECIFIED TYPE: ICD-10-CM

## 2024-12-10 PROCEDURE — G8419 CALC BMI OUT NRM PARAM NOF/U: HCPCS | Performed by: OBSTETRICS & GYNECOLOGY

## 2024-12-10 PROCEDURE — 1036F TOBACCO NON-USER: CPT | Performed by: OBSTETRICS & GYNECOLOGY

## 2024-12-10 PROCEDURE — 99213 OFFICE O/P EST LOW 20 MIN: CPT | Performed by: OBSTETRICS & GYNECOLOGY

## 2024-12-10 PROCEDURE — G8427 DOCREV CUR MEDS BY ELIG CLIN: HCPCS | Performed by: OBSTETRICS & GYNECOLOGY

## 2024-12-10 PROCEDURE — 3017F COLORECTAL CA SCREEN DOC REV: CPT | Performed by: OBSTETRICS & GYNECOLOGY

## 2024-12-10 PROCEDURE — G8484 FLU IMMUNIZE NO ADMIN: HCPCS | Performed by: OBSTETRICS & GYNECOLOGY

## 2024-12-10 NOTE — PATIENT INSTRUCTIONS
cancer.  Coughing associated with smoking may lead to stress incontinence during coughing spasms.  You should stop smoking today for these and many other reasons.    In older people and people limited by arthritis or other disabilities, it is important to:    -     Use the toilet regularly - every 2 ½ to 3 ½ hours.  Wear clothes that are easy to get off when it is time to go to the toilet.  Remain on the toilet until your bladder is empty.  If you feel there is still some urine in the bladder, stand up and then sit back down again and lean forward slightly over the knees.  This is called “double voiding” and may help you empty your bladder.  Make the toilet facilities convenient.  This may mean a bedside commode, bedpan, or urinal placed conveniently near the bed.  Empty your bladder before you start on a journey of an hour or more.  Do not try to “wait until I get home to my own bathroom.”

## 2024-12-30 RX ORDER — ESTRADIOL 0.1 MG/G
1 CREAM VAGINAL
Qty: 42.5 G | Refills: 0 | Status: SHIPPED | OUTPATIENT
Start: 2024-12-30

## 2025-03-02 NOTE — PROGRESS NOTES
[facility], [facility] UROGYNECOLOGY AND PELVIC REHABILITATION   [address] [place] [address]  Dept: [phone]   Patient:  Annalisa Nance   :  1965   Visit Date:  3/3/2025     VISIT - ANNUAL WELL EXAM  CC: This is an annual well patient visit.    HPI:   Patient is here for an annual exam. Recent cystocele repair in . Previous hysterectomy. She denies pain or vaginal bleeding. She is going to start seeing PFT for scar tissue, from previous surgeries. No itching, burning or abn discharge. She does also have some constipation; takes Miralax daily. Colonoscopy is up to date. She is somewhat active with exercise; although, she has gained about 10 lbs over the past year. She is working to increase her activity levels. She is sexually active; some introital discomfort. She is using VET - she sometimes struggles with remembering to take. She also uses luberlube. She has also joined a PFT carlos, that she has been working on. No breast concerns. She does not need a mammogram order today. She has never had a DEXA scan; menopause about 5 years ago. No urinary concerns. Sleeping well - uses a bipap. She is having trouble with the mask fitting, mood is good, feels safe at home. Non-smoker. She is only on VET. She denies having any bothersome bloating, but does have GERD.     Past Surgical History:   Procedure Laterality Date    BLADDER SUSPENSION      TVTO    CHOLECYSTECTOMY          COLPOSCOPY      EYE SURGERY      chalazion    HYSTERECTOMY, VAGINAL N/A 2023    LAPAROSCOPIC ROBOTIC ASSISTED VAGINAL HYSTERECTOMY  WITH BILATERAL SALPINGECTOMY,  ANTERIOR COLPORRHAPHY AND AND POSTERIOR COLPOPERINEORRHAPHY performed by Armando Hunter DO at [facility] OR    TONSILLECTOMY      URETHRAL SURGERY N/A 2023    CYSTOSCOPY WITH BULKAMID INJECTION performed by Armando Hunter DO at [facility] OR    VAGINA SURGERY N/A 6/10/2024

## 2025-03-03 ENCOUNTER — OFFICE VISIT (OUTPATIENT)
Age: 60
End: 2025-03-03
Payer: COMMERCIAL

## 2025-03-03 ENCOUNTER — HOSPITAL ENCOUNTER (OUTPATIENT)
Age: 60
Setting detail: SPECIMEN
Discharge: HOME | End: 2025-03-03

## 2025-03-03 VITALS
OXYGEN SATURATION: 100 % | WEIGHT: 172 LBS | DIASTOLIC BLOOD PRESSURE: 90 MMHG | SYSTOLIC BLOOD PRESSURE: 143 MMHG | BODY MASS INDEX: 29.52 KG/M2 | HEART RATE: 70 BPM

## 2025-03-03 DIAGNOSIS — Z11.51 SPECIAL SCREENING EXAMINATION FOR HUMAN PAPILLOMAVIRUS (HPV): ICD-10-CM

## 2025-03-03 DIAGNOSIS — Z12.31 ENCOUNTER FOR SCREENING MAMMOGRAM FOR MALIGNANT NEOPLASM OF BREAST: ICD-10-CM

## 2025-03-03 DIAGNOSIS — R10.2 PELVIC PRESSURE IN FEMALE: ICD-10-CM

## 2025-03-03 DIAGNOSIS — Z78.0 MENOPAUSE: ICD-10-CM

## 2025-03-03 DIAGNOSIS — Z01.419 WELL WOMAN EXAM WITH ROUTINE GYNECOLOGICAL EXAM: Primary | ICD-10-CM

## 2025-03-03 PROCEDURE — 99396 PREV VISIT EST AGE 40-64: CPT

## 2025-03-03 ASSESSMENT — ENCOUNTER SYMPTOMS: GASTROINTESTINAL NEGATIVE: 1

## 2025-03-05 LAB
HPV I/H RISK 4 DNA CVX QL NAA+PROBE: NOT DETECTED
HPV SAMPLE: NORMAL
HPV, INTERPRETATION: NORMAL
HPV16 DNA CVX QL NAA+PROBE: NOT DETECTED
HPV18 DNA CVX QL NAA+PROBE: NOT DETECTED
SPECIMEN DESCRIPTION: NORMAL

## 2025-03-06 ENCOUNTER — OFFICE VISIT (OUTPATIENT)
Age: 60
End: 2025-03-06

## 2025-03-06 DIAGNOSIS — N81.84 PELVIC MUSCLE WASTING: ICD-10-CM

## 2025-03-06 DIAGNOSIS — R10.2 PELVIC PAIN: ICD-10-CM

## 2025-03-06 DIAGNOSIS — L90.5 SCAR TISSUE: ICD-10-CM

## 2025-03-06 DIAGNOSIS — R27.8 DECREASED COORDINATION: ICD-10-CM

## 2025-03-06 DIAGNOSIS — N39.41 URGE INCONTINENCE: Primary | ICD-10-CM

## 2025-03-06 NOTE — PATIENT INSTRUCTIONS
Perineal Stretching:    Perform 5-10 min 1x daily or every other day.       Apply lubricant to the vaginal opening and to your thumb or index finger   Imagine you vaginal opening like a clock, where 12 o clock is clitoris and 6 o clock is rectal area.    Insert the tip of your thumb (to the first knuckle) into the vagina.   Gently press downwards towards your tailbone towards 6 o clock.    Hold for 30-60 seconds (discomfort should be no greater than 4/10 on pain scale)    Then move a little to the left (to around 3 o clock)  and do it again (downward pressure)    Then move to right (around 9 o clock) and do it again.    Apply a gentle sweeping motion along the lower half of your vagina (3-9 o clock)            The Knack     Lift and squeeze the muscles in and around all three pelvic openings (urethra, vagina, and anus) immediately before you cough, sneeze or lift  Contract around all three pelvic openings at once, with a strong inward lift and squeeze of your pelvic floor muscles  Maintain this pelvic floor muscle contraction as you do a small cough  After you cough, relax your pelvic floor muscles back to normal resting level  Progress this exercise with more forceful cough, or repeating a couple of coughs in a row maintaining your pelvic floor contraction throughout as you do so      ‘The Knack’ can be used with events or activities that increase downward pressure upon your pelvic floor such as:  Coughing  Sneezing  Lifting  Blowing your nose  Rising into standing from sitting  Stepping down heavily.     Instructions for reducing urinary urgency:  Stop what you are doing and stand or sit still  Tighten pelvic floor muscles 5-10 times quickly   Take 2-3 big diaphragmatic breaths  Relax the rest of your body - do not panic - concentrate on suppressing the urge sensation  Wait until the urge subsides  If it does not try steps 1-3 again!  If after 2 times, urge remains, walk to the bathroom at a normal pace - do not

## 2025-03-06 NOTE — PROGRESS NOTES
[facility] UROGYNECOLOGY AND PELVIC REHABILITATION   [address] [place] [address]  [address] [place] [address]     Physical Therapy Pelvic Floor Evaluation    Date:  3/6/2025  Patient: Annalisa Nance  : 1965  MRN: [id number]  Physician: Armando Hunter DO     Insurance: Medical [place]   Medical Diagnosis/Rehab Codes:   Encounter Diagnoses   Name Primary?    Urge incontinence Yes    Scar tissue     Pelvic pain     Decreased coordination     Pelvic muscle wasting         Onset Date:                                       Subjective:   CC/HPI:Pt is a 59 y.o. yo female who presents with post prolapse repair. She had her first repair of her cystocele and rectocele in . In the same year, she had the rectocele     In 2024, she had her rectocele repair done with a graft and is better.     She does have a sig    She has been using a program that's called a \"buff muff method\" that is an carlos by Jillian (the vagina ) and has been participating in it. 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 She is on lifting restrictions of 30#.     She reports that she has more superficial pain with intercourse and will have significant sacral pain at rest (will even have tenderness to touch).     She did have a bladder sling repair (was good and holding well). For the first surgery, she did have the gel injection. She reports that she does have significant urgency. She reports that she is so fearful of leaking that she then will go to the bathroom more frequently. She does have to do double voiding because she will have urgency right after urinating.     Does use a bipap machine.       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

## 2025-03-08 ENCOUNTER — RESULTS FOLLOW-UP (OUTPATIENT)
Dept: LAB | Age: 60
End: 2025-03-08

## 2025-03-08 LAB — CYTOLOGY REPORT: NORMAL

## 2025-03-13 ENCOUNTER — EVALUATION (OUTPATIENT)
Age: 60
End: 2025-03-13

## 2025-03-13 DIAGNOSIS — N81.84 PELVIC MUSCLE WASTING: ICD-10-CM

## 2025-03-13 DIAGNOSIS — R10.2 PELVIC PAIN: ICD-10-CM

## 2025-03-13 DIAGNOSIS — L90.5 SCAR TISSUE: ICD-10-CM

## 2025-03-13 DIAGNOSIS — N39.41 URGE INCONTINENCE: Primary | ICD-10-CM

## 2025-03-13 DIAGNOSIS — R27.8 DECREASED COORDINATION: ICD-10-CM

## 2025-03-13 NOTE — PROGRESS NOTES
[facility] UROGYNECOLOGY AND PELVIC REHABILITATION   [address] [place] [address]  Dept: [phone]     Physical Therapy Daily Treatment Note    Date:  3/13/2025  Patient Name:  Annalisa Nance    :  1965  MRN: [id number]  Physician: Armando Hunter DO                                   Insurance: Medical [place]   Medical Diagnosis/Rehab Codes:        Encounter Diagnoses   Name Primary?    Urge incontinence Yes    Scar tissue      Pelvic pain      Decreased coordination      Pelvic muscle wasting           Onset Date:         Visit# / total visits: ;    Cancels/No Shows: 0    Subjective:    Pain:  [x] Yes  [] No Location: pelvis Pain Rating: (0-10 scale) not rated/10  Pain altered Tx:  [] No  [] Yes  Action:    Today, Annalisa reports that she did bring dilator with her. She tried it once and still feel sore. She had increased urinary urgency. She does feel like tissue is very sore and feels \"twingy.\"    She declines any urinary burning. She reports that she is unsure if she could have a UTI.      Progress made: none  Persistent symptoms: pelvic pain,aching and sacral pain  New symptoms: back tightness    Annalisa reports \"feeling worse\" following last appointment, and endorses fair compliance with home exercise program. Annalisa did have an  adverse response to prescribed home exercises/activities. She had icnrease urinary urgency and achiness post dilation. She did report she did not read through directions and did mobilize the urethra and bladder with the dilator which explains soreness.     Plan for today's session was verbally explained to patient along with clinical rationale. Annalisa  verbalized agreement with today's plan prior to the initiation of treatment.  Today’s Treatment:    Consent: Patient verbally consented to vaginal external assessment with no red flags present 3/13/2025. Patient was appropriately draped

## 2025-03-13 NOTE — PATIENT INSTRUCTIONS
Perineal Stretching:    Perform 5-10 min 1x daily or every other day.       Apply lubricant to the vaginal opening and to your thumb or index finger   Imagine you vaginal opening like a clock, where 12 o clock is clitoris and 6 o clock is rectal area.    Insert the tip of your thumb (to the first knuckle) into the vagina.   Gently press downwards towards your tailbone towards 6 o clock.    Hold for 30-60 seconds (discomfort should be no greater than 4/10 on pain scale)    Then move a little to the left (to around 3 o clock)  and do it again (downward pressure)    Then move to right (around 9 o clock) and do it again.    Apply a gentle sweeping motion along the lower half of your vagina (3-9 o clock)                   Yoga with Sapna on Youtube : yoga with pelvic floor and yoga for back pain     Intimate yoselin vibrating wand

## 2025-03-25 ENCOUNTER — EVALUATION (OUTPATIENT)
Age: 60
End: 2025-03-25
Payer: COMMERCIAL

## 2025-03-25 DIAGNOSIS — R27.8 DECREASED COORDINATION: ICD-10-CM

## 2025-03-25 DIAGNOSIS — L90.5 SCAR TISSUE: ICD-10-CM

## 2025-03-25 DIAGNOSIS — N39.41 URGE INCONTINENCE: Primary | ICD-10-CM

## 2025-03-25 DIAGNOSIS — R10.2 PELVIC PAIN: ICD-10-CM

## 2025-03-25 DIAGNOSIS — N81.84 PELVIC MUSCLE WASTING: ICD-10-CM

## 2025-03-25 PROCEDURE — 97530 THERAPEUTIC ACTIVITIES: CPT

## 2025-03-25 PROCEDURE — 97140 MANUAL THERAPY 1/> REGIONS: CPT

## 2025-03-25 NOTE — PROGRESS NOTES
[facility] PHYSICIANS [facility] UROGYNECOLOGY AND PELVIC REHABILITATION  [address]  [address]  [facility] [address]  Dept: [phone]     Physical Therapy Daily Treatment Note    Date:  3/25/2025  Patient Name:  Annalisa Nance    :  1965  MRN: [id number]  Physician: Armando Hunter DO                                   Insurance: Medical [place]   Diagnosis:   Encounter Diagnoses   Name Primary?    Urge incontinence Yes    Scar tissue     Pelvic pain     Decreased coordination     Pelvic muscle wasting        Onset Date:      Visit# / total visits: 3/8     Cancels/No Shows: 0    Subjective:    Pain:  [] Yes  [] No Location: R SI/hip and low back  Pain Rating: (0-10 scale) \"tight and painful\"/10  Pain altered Tx:  [] No  [] Yes  Action:    Today, Annalisa reports that she did do 2 yogas. She can tell how painful and tight she is on her R side. She has been doing the buff muff med and she has been doing the yoga through that. She reports the resources are really good and has dietary considerations, speakers, resources etc; escalated exercise regiments. She has been focusing on strengthening exercise program with pelvic floor and prolapse emphasis via the buff muff carlos and program.     Letitia is coming. She did order the lubricant and vulvar balm that was recommended.     She did end up having   Progress made: able to do yoga  Persistent symptoms: dyspareunia, pelvic floor weakness/prolapse repair and back/pelvic pain   New symptoms: did have UTI, now gone    Annalisa reports \"feeling better\" following last appointment, and endorses good compliance with home exercise program. Annalisa no adverse response to prescribed home exercises/activities.     Plan for today's session was verbally explained to patient along with clinical rationale. Annalisa  verbalized agreement with today's plan prior to the initiation of treatment.    Objective:    Consent: Patient verbally

## 2025-04-04 NOTE — PROGRESS NOTES
[facility] PHYSICIANS [facility], [facility] UROGYNECOLOGY AND PELVIC REHABILITATION  [address]  [address]  [facility] [address]  Dept: [phone]     Date of Visit: 2025   Patient: Annalisa Nance   : 1965   Referring Physician: Armando Hunter DO    Insurance: Medical [place]    Visit#:  4   Visit Diagnoses:       Visit Diagnoses         Codes      Urgency incontinence    -  Primary N39.41      Scar tissue     L90.5      Pelvic pain     R10.2      Decreased coordination     R27.8      Pelvic muscle wasting     N81.84            Subjective:  Today, Annalisa reports that she did do yoga a couple of times but doing more buff mutt program still modified as she feels she still has pressure of prolapse when doing certain activities. She can tell how painful and tight she is on her R side.  Pt did get the wand and has used it a couple of times and using [place] with intercourse as well however still very painful      Objective:   Moderate tightness noted at bilateral 1-3rd layers with left greater than right    Significant tenderness noted at bilateral OI    No cupping this date as she just got the shingels vaccine    Treatment:  Manual Therapy:  30 MIN Internal MFR, correct right Upslip with MET  There-Act:  23 MIN  Reviewed POC and HEP.  Use of dilators at home, kegels holding for 5 seconds resting for 15 for 5 reps 3-4 times a day, bracing with activities    Assessment:  Pt had moderate tightness noted 1-3rd layers internally with left greater than right very narrow canal towards the 3rd layer decreased after internal MFR.  Pt educated on use of dilators and importance of bracing.   Pt will continue to benefit from skilled PT to increase mobility and decrease pain        LTG ( to be met in 8 treatments):  GOALS NOT MET  3/6/2025   Pt to improve muscle strength and recruitment of PFM on a daily basis using her home exercise program to increase continence.   3/6/2025  Pt will

## 2025-04-07 ENCOUNTER — EVALUATION (OUTPATIENT)
Age: 60
End: 2025-04-07
Payer: COMMERCIAL

## 2025-04-07 DIAGNOSIS — L90.5 SCAR TISSUE: ICD-10-CM

## 2025-04-07 DIAGNOSIS — N81.84 PELVIC MUSCLE WASTING: ICD-10-CM

## 2025-04-07 DIAGNOSIS — R10.2 PELVIC PAIN: ICD-10-CM

## 2025-04-07 DIAGNOSIS — R27.8 DECREASED COORDINATION: ICD-10-CM

## 2025-04-07 DIAGNOSIS — N39.41 URGENCY INCONTINENCE: Primary | ICD-10-CM

## 2025-04-07 PROCEDURE — 97140 MANUAL THERAPY 1/> REGIONS: CPT

## 2025-04-07 PROCEDURE — 97530 THERAPEUTIC ACTIVITIES: CPT

## 2025-04-24 ENCOUNTER — PATIENT MESSAGE (OUTPATIENT)
Age: 60
End: 2025-04-24

## 2025-04-24 ENCOUNTER — RESULTS FOLLOW-UP (OUTPATIENT)
Age: 60
End: 2025-04-24

## 2025-05-24 ENCOUNTER — HOSPITAL ENCOUNTER
Dept: HOSPITAL 101 - LAB | Age: 60
Discharge: HOME | End: 2025-05-24
Payer: COMMERCIAL

## 2025-05-24 ENCOUNTER — HOSPITAL ENCOUNTER
Age: 60
Discharge: HOME | End: 2025-05-24
Payer: COMMERCIAL

## 2025-05-24 DIAGNOSIS — M54.16: Primary | ICD-10-CM

## 2025-05-24 DIAGNOSIS — R73.9: ICD-10-CM

## 2025-05-24 DIAGNOSIS — M51.369: ICD-10-CM

## 2025-05-24 DIAGNOSIS — M43.16: ICD-10-CM

## 2025-05-24 DIAGNOSIS — R53.83: ICD-10-CM

## 2025-05-24 DIAGNOSIS — E78.2: ICD-10-CM

## 2025-05-24 LAB
CHOLESTEROL: 209 MG/DL (ref ?–200)
HDL CHOLESTEROL: 73 MG/DL (ref 40–60)
TRIGLYCERIDES: 52 MG/DL (ref ?–150)
VLDL CHOLESTEROL: 10.4 MG/DL

## 2025-05-24 PROCEDURE — 72110 X-RAY EXAM L-2 SPINE 4/>VWS: CPT

## 2025-05-24 PROCEDURE — 83525 ASSAY OF INSULIN: CPT

## 2025-05-24 PROCEDURE — 82533 TOTAL CORTISOL: CPT

## 2025-05-24 PROCEDURE — 83036 HEMOGLOBIN GLYCOSYLATED A1C: CPT

## 2025-05-24 PROCEDURE — 36415 COLL VENOUS BLD VENIPUNCTURE: CPT

## 2025-05-24 PROCEDURE — 80061 LIPID PANEL: CPT

## 2025-06-06 DIAGNOSIS — Z01.419 WELL WOMAN EXAM WITH ROUTINE GYNECOLOGICAL EXAM: ICD-10-CM

## 2025-06-06 DIAGNOSIS — Z78.0 MENOPAUSE: ICD-10-CM

## 2025-06-06 PROCEDURE — 77080 DXA BONE DENSITY AXIAL: CPT

## 2025-06-08 ENCOUNTER — RESULTS FOLLOW-UP (OUTPATIENT)
Age: 60
End: 2025-06-08

## 2025-06-24 ENCOUNTER — TELEPHONE (OUTPATIENT)
Dept: CARDIOLOGY | Facility: CLINIC | Age: 60
End: 2025-06-24

## 2025-06-24 ENCOUNTER — APPOINTMENT (OUTPATIENT)
Dept: CARDIOLOGY | Facility: CLINIC | Age: 60
End: 2025-06-24
Payer: COMMERCIAL

## 2025-06-24 NOTE — TELEPHONE ENCOUNTER
Left a message for patient to call me directly in regards to text messages she said she got for her appointments that had incorrect information in them.

## 2025-08-22 ENCOUNTER — APPOINTMENT (OUTPATIENT)
Dept: CARDIOLOGY | Facility: CLINIC | Age: 60
End: 2025-08-22
Payer: COMMERCIAL

## 2025-08-22 VITALS
DIASTOLIC BLOOD PRESSURE: 82 MMHG | HEIGHT: 64 IN | BODY MASS INDEX: 30.8 KG/M2 | HEART RATE: 64 BPM | SYSTOLIC BLOOD PRESSURE: 118 MMHG | WEIGHT: 180.4 LBS

## 2025-08-22 DIAGNOSIS — E78.2 MIXED HYPERLIPIDEMIA: ICD-10-CM

## 2025-08-22 DIAGNOSIS — Z78.9 NEVER SMOKED TOBACCO: ICD-10-CM

## 2025-08-22 DIAGNOSIS — I51.81 TAKOTSUBO SYNDROME: Primary | ICD-10-CM

## 2025-08-22 DIAGNOSIS — G47.33 OBSTRUCTIVE SLEEP APNEA: ICD-10-CM

## 2025-08-22 DIAGNOSIS — E66.9 OBESITY (BMI 30-39.9): ICD-10-CM

## 2025-08-22 PROCEDURE — 99213 OFFICE O/P EST LOW 20 MIN: CPT | Performed by: INTERNAL MEDICINE

## 2025-08-22 PROCEDURE — 3008F BODY MASS INDEX DOCD: CPT | Performed by: INTERNAL MEDICINE

## 2025-08-22 PROCEDURE — 1036F TOBACCO NON-USER: CPT | Performed by: INTERNAL MEDICINE

## 2025-08-22 RX ORDER — EZETIMIBE 10 MG/1
10 TABLET ORAL NIGHTLY
Qty: 90 TABLET | Refills: 3 | Status: SHIPPED | OUTPATIENT
Start: 2025-08-22 | End: 2026-08-22

## 2026-07-24 ENCOUNTER — APPOINTMENT (OUTPATIENT)
Dept: CARDIOLOGY | Facility: CLINIC | Age: 61
End: 2026-07-24
Payer: COMMERCIAL

## (undated) DEVICE — SUTURE VICRYL SZ 2-0 L27IN ABSRB UD L26MM CT-2 1/2 CIR J269H

## (undated) DEVICE — INTENDED FOR TISSUE SEPARATION, AND OTHER PROCEDURES THAT REQUIRE A SHARP SURGICAL BLADE TO PUNCTURE OR CUT.: Brand: BARD-PARKER ® CARBON RIB-BACK BLADES

## (undated) DEVICE — STRAP RESTRAIN W3.5XL19IN TECLIN STRRP POS LEG DURING LITH

## (undated) DEVICE — Y-TYPE TUR/BLADDER IRRIGATION SET, REGULATING CLAMP

## (undated) DEVICE — ELECTRODE PT RET AD L9FT HI MOIST COND ADH HYDRGEL CORDED

## (undated) DEVICE — UNDERPANTS MAT L/XL KNIT SEAMLESS CLR CODE WAISTBAND

## (undated) DEVICE — 1010 S-DRAPE TOWEL DRAPE 10/BX: Brand: STERI-DRAPE™

## (undated) DEVICE — PAD PT POS 36 IN SURGYPAD DISP

## (undated) DEVICE — BLADE CLIPPER SURG SENSICLIP

## (undated) DEVICE — PAD,SANITARY,11 IN,MAXI,W/WINGS,N-STRL: Brand: MEDLINE

## (undated) DEVICE — DRAPE,REIN 53X77,STERILE: Brand: MEDLINE

## (undated) DEVICE — SUTURE VCRL SZ 0 L36IN ABSRB UD L36MM CT-1 1/2 CIR J946H

## (undated) DEVICE — GOWN,SIRUS,NONRNF,SETINSLV,XL,20/CS: Brand: MEDLINE

## (undated) DEVICE — SOLUTION IRRIG 1000ML 0.9% SOD CHL USP POUR PLAS BTL

## (undated) DEVICE — SEAL

## (undated) DEVICE — GLOVE ORANGE PI 7 1/2   MSG9075

## (undated) DEVICE — COUNTER NDL 40 COUNT HLD 70 FOAM BLK ADH W/ MAG

## (undated) DEVICE — SYRINGE IRRIG 60ML SFT PLIABLE BLB EZ TO GRP 1 HND USE W/

## (undated) DEVICE — DRAINBAG,ANTI-REFLUX TOWER,L/F,2000ML,LL: Brand: MEDLINE

## (undated) DEVICE — SUTURE MCRYL + SZ 4-0 L27IN ABSRB UD L19MM PS-2 3/8 CIR MCP426H

## (undated) DEVICE — SUTURE VCRL + SZ 1 L36IN ABSRB UD L36MM CT-1 1/2 CIR VCP947H

## (undated) DEVICE — SET,IRRIGATION,CYSTO,Y-TYPE,90": Brand: MEDLINE

## (undated) DEVICE — SUTURE PDS II SZ 0 L36IN ABSRB VLT L36MM CT-1 1/2 CIR Z346H

## (undated) DEVICE — PLUMEPORT ACTIV LAPAROSCOPIC SMOKE FILTRATION DEVICE: Brand: PLUMEPORT ACTIVE

## (undated) DEVICE — DEVICE TRCR 12X9X3IN WHT CLSR DISP OMNICLOSE

## (undated) DEVICE — TIP COVER ACCESSORY

## (undated) DEVICE — ARM DRAPE

## (undated) DEVICE — SYRINGE, LUER LOCK, 10ML: Brand: MEDLINE

## (undated) DEVICE — Device

## (undated) DEVICE — VCARE MEDIUM, UTERINE MANIPULATOR, VAGINAL-CERVICAL-AHLUWALIA'S-RETRACTOR-ELEVATOR: Brand: VCARE

## (undated) DEVICE — LIQUIBAND RAPID ADHESIVE 36/CS 0.8ML: Brand: MEDLINE

## (undated) DEVICE — DRAPE,UNDRBUT,WHT GRAD PCH,CAPPORT,20/CS: Brand: MEDLINE

## (undated) DEVICE — DEVICE SUT CAPT L25CM DIA12MM OPN ACCS W/O SLNG FOR

## (undated) DEVICE — BLADELESS OBTURATOR: Brand: WECK VISTA

## (undated) DEVICE — SOLUTION IV 1000ML 0.9% SOD CHL PH 5 INJ USP VIAFLX PLAS

## (undated) DEVICE — APPLICATOR MEDICATED 26 CC SOLUTION HI LT ORNG CHLORAPREP

## (undated) DEVICE — UNDERPANTS MAT L XL SEAMLESS CLR CODE WAISTBAND KNIT

## (undated) DEVICE — GLOVE ORANGE PI 7   MSG9070

## (undated) DEVICE — SUTURE VICRYL + SZ 1 L36IN ABSRB UD L36MM CT-1 1/2 CIR VCP947H

## (undated) DEVICE — SUTURE VCRL SZ 2-0 L27IN ABSRB UD L26MM CT-2 1/2 CIR J269H

## (undated) DEVICE — BLANKET WRM W29.9XL79.1IN UP BODY FORC AIR MISTRAL-AIR

## (undated) DEVICE — COUNTER NDL 10 COUNT HLD 20 FOAM BLK SGL MAG

## (undated) DEVICE — GLOVE SURG SZ 65 THK91MIL LTX FREE SYN POLYISOPRENE

## (undated) DEVICE — MHPB GYN MINOR PACK: Brand: MEDLINE INDUSTRIES, INC.

## (undated) DEVICE — SOLUTION PREP PAINT POV IOD FOR SKIN MUCOUS MEM

## (undated) DEVICE — ELECTRODE ES L3IN S STL BLDE INSUL DISP VALLEYLAB EDGE

## (undated) DEVICE — TROCAR: Brand: KII FIOS FIRST ENTRY

## (undated) DEVICE — TOTAL TRAY, 16FR 10ML SIL FOLEY, URN: Brand: MEDLINE

## (undated) DEVICE — TUBING, SUCTION, 3/16" X 10', STRAIGHT: Brand: MEDLINE

## (undated) DEVICE — SNAPSECURE FOLEY DEVICE: Brand: MEDLINE

## (undated) DEVICE — CATHETER URETH 14FR BLLN 5CC SIL HYDRGEL 2 W F LUBRICIOUS

## (undated) DEVICE — INSUFFLATION NEEDLE TO ESTABLISH PNEUMOPERITONEUM.: Brand: INSUFFLATION NEEDLE

## (undated) DEVICE — SUTURE SZ 0 27IN 5/8 CIR UR-6  TAPER PT VIOLET ABSRB VICRYL J603H

## (undated) DEVICE — SOLUTION ANTIFOG VIS SYS CLEARIFY LAPSCP

## (undated) DEVICE — STRAP,POSITIONING,KNEE/BODY,FOAM,4X60": Brand: MEDLINE

## (undated) DEVICE — SOLUTION SCRB 4OZ 10% POVIDONE IOD ANTIMIC BTL

## (undated) DEVICE — MHPB BASIC LAP PACK: Brand: MEDLINE INDUSTRIES, INC.